# Patient Record
Sex: FEMALE | Race: WHITE | NOT HISPANIC OR LATINO | Employment: PART TIME | URBAN - METROPOLITAN AREA
[De-identification: names, ages, dates, MRNs, and addresses within clinical notes are randomized per-mention and may not be internally consistent; named-entity substitution may affect disease eponyms.]

---

## 2019-03-25 LAB
EXTERNAL ABO GROUPING: (no result)
EXTERNAL ANTIBODY SCREEN: NORMAL
EXTERNAL HEMATOCRIT: 41 %
EXTERNAL HEMOGLOBIN: 14 G/DL
EXTERNAL HEPATITIS B SURFACE ANTIGEN: NEGATIVE
EXTERNAL PLATELET COUNT: 227 K/ΜL
EXTERNAL RH FACTOR: POSITIVE
EXTERNAL SYPHILIS RPR SCREEN: (no result)
RUBV IGG SERPL IA-ACNC: 0.9

## 2019-06-06 ENCOUNTER — TELEPHONE (OUTPATIENT)
Dept: OBSTETRICS AND GYNECOLOGY | Age: 24
End: 2019-06-06

## 2019-06-06 NOTE — TELEPHONE ENCOUNTER
Pt is moving here next week and is needing to get establish with an OB doctor. Pt is 16 weeks. Her last prenatal care visit with them was two weeks ago and pt has one more appt scheduled before she moves for next Friday. No glucose testing done yet or Anatomy U/S. I gave pt our fax # to get her records sent to us.     Ins:Shannon

## 2019-07-11 ENCOUNTER — INITIAL PRENATAL (OUTPATIENT)
Dept: OBSTETRICS AND GYNECOLOGY | Age: 24
End: 2019-07-11

## 2019-07-11 ENCOUNTER — PROCEDURE VISIT (OUTPATIENT)
Dept: OBSTETRICS AND GYNECOLOGY | Age: 24
End: 2019-07-11

## 2019-07-11 VITALS
HEIGHT: 63 IN | DIASTOLIC BLOOD PRESSURE: 58 MMHG | SYSTOLIC BLOOD PRESSURE: 98 MMHG | WEIGHT: 117 LBS | BODY MASS INDEX: 20.73 KG/M2

## 2019-07-11 DIAGNOSIS — Z36.86 ENCOUNTER FOR SCREENING FOR RISK OF PRE-TERM LABOR: ICD-10-CM

## 2019-07-11 DIAGNOSIS — Z34.90 PREGNANCY, UNSPECIFIED GESTATIONAL AGE: Primary | ICD-10-CM

## 2019-07-11 DIAGNOSIS — Z36.89 ENCOUNTER FOR FETAL ANATOMIC SURVEY: Primary | ICD-10-CM

## 2019-07-11 PROBLEM — Z23 NEED FOR MEASLES-MUMPS-RUBELLA (MMR) VACCINE: Status: ACTIVE | Noted: 2019-07-11

## 2019-07-11 LAB — VZV IGG SER QL: NORMAL

## 2019-07-11 PROCEDURE — 76817 TRANSVAGINAL US OBSTETRIC: CPT | Performed by: OBSTETRICS & GYNECOLOGY

## 2019-07-11 PROCEDURE — 76805 OB US >/= 14 WKS SNGL FETUS: CPT | Performed by: OBSTETRICS & GYNECOLOGY

## 2019-07-11 PROCEDURE — 0501F PRENATAL FLOW SHEET: CPT | Performed by: OBSTETRICS & GYNECOLOGY

## 2019-07-11 RX ORDER — LORATADINE 10 MG/1
CAPSULE, LIQUID FILLED ORAL
COMMUNITY
End: 2021-10-15

## 2019-07-11 RX ORDER — CHLORAL HYDRATE 500 MG
CAPSULE ORAL
COMMUNITY
End: 2021-10-15

## 2019-07-11 RX ORDER — LANOLIN ALCOHOL/MO/W.PET/CERES
50 CREAM (GRAM) TOPICAL DAILY
COMMUNITY
End: 2019-09-13

## 2019-07-11 RX ORDER — ASPIRIN 81 MG/1
81 TABLET ORAL DAILY
Qty: 30 TABLET | Refills: 4 | Status: ON HOLD | OUTPATIENT
Start: 2019-07-11 | End: 2019-10-28

## 2019-07-11 NOTE — PROGRESS NOTES
The patient is a 24-year-old  1 para 0 at 21-3/7 weeks who just moved here from [place].  The patient works as a physical therapist and her  is a physician starting his residency and internal medicine and pediatrics.  Patient's pregnancy has gone well.  She has brought records including ultrasound and Pap but her blood work is not included.  She states that pregnancy was dated by ultrasound.  Her menses were about 40 days apart.  Patient declined any genetic testing.  She is taking prenatal vitamins and has no complaints.    Full history was reviewed.  History is remarkable for her mother having preeclampsia.    Limited physical exam is done see physical exam tab  Ultrasound- anatomy ultrasound shows normal anatomy within the limits of ultrasound.  Baby is a girl.  Size equal to dates.  Cervical length is normal at 4.0 cm.  Placenta is anterior with no previa.    Assessment- 21 weeks  Transfer of care due to move for her 's residency.  New OB information and folder were reviewed in detail  Patient is a candidate for low-dose aspirin treatment to prevent preeclampsia due to her nulliparous status and family history of preeclampsia in her mother.  Information was given to the patient and her .  Notes are reviewed.  Ultrasound due date will be used.  Notes state that the patient is rubella nonimmune.  We will request labs from [place]

## 2019-07-26 ENCOUNTER — TELEPHONE (OUTPATIENT)
Dept: OBSTETRICS AND GYNECOLOGY | Age: 24
End: 2019-07-26

## 2019-08-02 ENCOUNTER — ROUTINE PRENATAL (OUTPATIENT)
Dept: OBSTETRICS AND GYNECOLOGY | Age: 24
End: 2019-08-02

## 2019-08-02 VITALS — DIASTOLIC BLOOD PRESSURE: 80 MMHG | SYSTOLIC BLOOD PRESSURE: 124 MMHG | BODY MASS INDEX: 21.43 KG/M2 | WEIGHT: 121 LBS

## 2019-08-02 DIAGNOSIS — Z34.02 ENCOUNTER FOR SUPERVISION OF NORMAL FIRST PREGNANCY IN SECOND TRIMESTER: Primary | ICD-10-CM

## 2019-08-02 PROCEDURE — 0502F SUBSEQUENT PRENATAL CARE: CPT | Performed by: OBSTETRICS & GYNECOLOGY

## 2019-08-02 RX ORDER — RANITIDINE 150 MG/1
150 TABLET ORAL 2 TIMES DAILY
Qty: 60 TABLET | Refills: 5 | Status: SHIPPED | OUTPATIENT
Start: 2019-08-02 | End: 2019-10-11

## 2019-08-02 NOTE — PROGRESS NOTES
Has some reflux.  She is tried taking some over-the-counter medicine but is still having issues.  Patient is considering going without an epidural she may consider nitrous but is still thinking about it.  Patient is supposed to be in a wedding at about 35 weeks.  Patient is feeling positive fetal movement    Fundal height is slightly low but patient is very petite.  She did gain 4 pounds since her last visit and weight gain is normal.  Ultrasound at last visit 3 weeks ago showed normal growth  Blood pressure is normal with no protein    Assessment-24 weeks  Suspect fundal height is low due to patient's small stature-check fetal weight at next visit  We discussed normal labor, epidural and nitrous.  Patient is still considering options.  Start Zantac for reflux  We discussed rubella nonimmune patient will need MMR postdelivery

## 2019-08-30 ENCOUNTER — ROUTINE PRENATAL (OUTPATIENT)
Dept: OBSTETRICS AND GYNECOLOGY | Age: 24
End: 2019-08-30

## 2019-08-30 ENCOUNTER — PROCEDURE VISIT (OUTPATIENT)
Dept: OBSTETRICS AND GYNECOLOGY | Age: 24
End: 2019-08-30

## 2019-08-30 VITALS — WEIGHT: 121 LBS | BODY MASS INDEX: 21.43 KG/M2 | SYSTOLIC BLOOD PRESSURE: 112 MMHG | DIASTOLIC BLOOD PRESSURE: 62 MMHG

## 2019-08-30 DIAGNOSIS — O36.5939 SGA (SMALL FOR GESTATIONAL AGE), FETAL, AFFECTING CARE OF MOTHER, ANTEPARTUM, THIRD TRIMESTER, OTHER FETUS: Primary | ICD-10-CM

## 2019-08-30 DIAGNOSIS — Z23 NEED FOR TDAP VACCINATION: ICD-10-CM

## 2019-08-30 DIAGNOSIS — Z13.1 SCREENING FOR DIABETES MELLITUS: Primary | ICD-10-CM

## 2019-08-30 DIAGNOSIS — Z3A.28 28 WEEKS GESTATION OF PREGNANCY: ICD-10-CM

## 2019-08-30 LAB
BASOPHILS # BLD AUTO: 0.04 10*3/MM3 (ref 0–0.2)
BASOPHILS NFR BLD AUTO: 0.4 % (ref 0–1.5)
EOSINOPHIL # BLD AUTO: 0.06 10*3/MM3 (ref 0–0.4)
EOSINOPHIL NFR BLD AUTO: 0.7 % (ref 0.3–6.2)
ERYTHROCYTE [DISTWIDTH] IN BLOOD BY AUTOMATED COUNT: 12.7 % (ref 12.3–15.4)
GLUCOSE 1H P 50 G GLC PO SERPL-MCNC: 85 MG/DL (ref 65–139)
HCT VFR BLD AUTO: 38.6 % (ref 34–46.6)
HGB BLD-MCNC: 12.9 G/DL (ref 12–15.9)
IMM GRANULOCYTES # BLD AUTO: 0.02 10*3/MM3 (ref 0–0.05)
IMM GRANULOCYTES NFR BLD AUTO: 0.2 % (ref 0–0.5)
LYMPHOCYTES # BLD AUTO: 1.51 10*3/MM3 (ref 0.7–3.1)
LYMPHOCYTES NFR BLD AUTO: 16.4 % (ref 19.6–45.3)
MCH RBC QN AUTO: 31.8 PG (ref 26.6–33)
MCHC RBC AUTO-ENTMCNC: 33.4 G/DL (ref 31.5–35.7)
MCV RBC AUTO: 95.1 FL (ref 79–97)
MONOCYTES # BLD AUTO: 0.43 10*3/MM3 (ref 0.1–0.9)
MONOCYTES NFR BLD AUTO: 4.7 % (ref 5–12)
NEUTROPHILS # BLD AUTO: 7.17 10*3/MM3 (ref 1.7–7)
NEUTROPHILS NFR BLD AUTO: 77.6 % (ref 42.7–76)
NRBC BLD AUTO-RTO: 0 /100 WBC (ref 0–0.2)
PLATELET # BLD AUTO: 219 10*3/MM3 (ref 140–450)
RBC # BLD AUTO: 4.06 10*6/MM3 (ref 3.77–5.28)
WBC # BLD AUTO: 9.23 10*3/MM3 (ref 3.4–10.8)

## 2019-08-30 PROCEDURE — 90715 TDAP VACCINE 7 YRS/> IM: CPT | Performed by: NURSE PRACTITIONER

## 2019-08-30 PROCEDURE — 0502F SUBSEQUENT PRENATAL CARE: CPT | Performed by: NURSE PRACTITIONER

## 2019-08-30 PROCEDURE — 90471 IMMUNIZATION ADMIN: CPT | Performed by: NURSE PRACTITIONER

## 2019-08-30 PROCEDURE — 76816 OB US FOLLOW-UP PER FETUS: CPT | Performed by: NURSE PRACTITIONER

## 2019-08-30 NOTE — PROGRESS NOTES
Here for routine PNV, growth ultrasound and 1 hour glucose test. She is 28w4d.  No problems.  She is taking Zantac BID and reflux symptoms have improved.  She has had one occasion of vomiting in the middle of the night but felt fine immediately after.  She reports good fetal movement and no contractions, bleeding or cramping.    A/P ultrasound done for S<D, EFW is SGA, 2lbs, 7 ozs, 12%.  AC 7.9%. Normal amniotic fluid, DVP 3.1. Discussed with Dr Palomo, will consult with MFM for small for gestational age and follow up here in 2 weeks as scheduled with BPP.

## 2019-09-03 ENCOUNTER — HOSPITAL ENCOUNTER (OUTPATIENT)
Dept: ULTRASOUND IMAGING | Facility: HOSPITAL | Age: 24
End: 2019-09-03

## 2019-09-03 ENCOUNTER — HOSPITAL ENCOUNTER (OUTPATIENT)
Dept: ULTRASOUND IMAGING | Facility: HOSPITAL | Age: 24
Discharge: HOME | End: 2019-09-03
Admitting: OBSTETRICS & GYNECOLOGY

## 2019-09-03 PROCEDURE — 76811 OB US DETAILED SNGL FETUS: CPT

## 2019-09-03 NOTE — CONSULTS
"Ms. Patel is referred by Dr Palomo for MFM consultation on indication of poor fetal growth.  She is unaccompanied during today's exam.  26 G1 at 29 1/7 per AUBRIE 11/18/19    Prenatal course is significant for  Pt's mom had preeclampsia, because of which pt is taking ASA 81mg daily.       PMH    Past Medical History:   Diagnosis Date   • Anxiety    • Broken arm     Right   • Dysmenorrhea        Allergies   Allergen Reactions   • Augmentin [Amoxicillin-Pot Clavulanate] Diarrhea       Meds  PNV  ASA  Zantac  Tums  Promethazine  Claritin    Past Surgical History:   Procedure Laterality Date   • WISDOM TOOTH EXTRACTION             Current Outpatient Medications:   •  aspirin 81 MG EC tablet, Take 1 tablet by mouth Daily., Disp: 30 tablet, Rfl: 4  •  Loratadine (CLARITIN) 10 MG capsule, Take  by mouth., Disp: , Rfl:   •  Omega-3 Fatty Acids (FISH OIL) 1000 MG capsule capsule, Take  by mouth Daily With Breakfast., Disp: , Rfl:   •  Prenatal Vit w/Ru-Uzciqtkwn-VH (PNV PO), Take  by mouth., Disp: , Rfl:   •  raNITIdine (ZANTAC) 150 MG tablet, Take 1 tablet by mouth 2 (Two) Times a Day., Disp: 60 tablet, Rfl: 5  •  vitamin B-6 (PYRIDOXINE) 50 MG tablet, Take 50 mg by mouth Daily., Disp: , Rfl:       Family history is negative for mental retardation, trisomy 21, congenital heart disease, spina bifida, cystic fibrosis, muscular dystrophy, other birth defects, Hinduism ancestry.   The patient has a family history of: none of the above.        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       Impression:  29 1/7 per AUBRIE 11/18/19  Normal fetal growth  BMI 21  Rubella nonimmune.         Recommendations:  Evaluate interval growth at [facility] in 3-4 weeks.   Continue ASA 81 mg daily.     TDaP vaccine at next prenatal appointment.     For billing purposes, " duration of face to face consultation was approximately 15 minutes of which > 50% was devoted to patient counseling and coordination of care, exclusive of US exam.

## 2019-09-03 NOTE — CONSULTS
"Ms. Patel is referred by Dr Palomo for MFM consultation on indication of poor fetal growth.  She is unaccompanied during today's exam.  26 G1 at 29 1/7 per AUBRIE 11/18/19     Prenatal course is significant for  Pt's mom had preeclampsia, because of which pt is taking ASA 81mg daily.   Pt states that she weighed 5#6oz when she was born at full term. Her spouse is also of modest stature.     PMH     Medical History        Past Medical History:   Diagnosis Date   • Anxiety     • Broken arm       Right   • Dysmenorrhea                   Allergies   Allergen Reactions   • Augmentin [Amoxicillin-Pot Clavulanate] Diarrhea         Meds  PNV  ASA  Zantac  Tums  Promethazine  Claritin     Surgical History         Past Surgical History:   Procedure Laterality Date   • WISDOM TOOTH EXTRACTION                      Current Outpatient Medications:   •  aspirin 81 MG EC tablet, Take 1 tablet by mouth Daily., Disp: 30 tablet, Rfl: 4  •  Loratadine (CLARITIN) 10 MG capsule, Take  by mouth., Disp: , Rfl:   •  Omega-3 Fatty Acids (FISH OIL) 1000 MG capsule capsule, Take  by mouth Daily With Breakfast., Disp: , Rfl:   •  Prenatal Vit w/Kx-Qltzcwbvk-BG (PNV PO), Take  by mouth., Disp: , Rfl:   •  raNITIdine (ZANTAC) 150 MG tablet, Take 1 tablet by mouth 2 (Two) Times a Day., Disp: 60 tablet, Rfl: 5  •  vitamin B-6 (PYRIDOXINE) 50 MG tablet, Take 50 mg by mouth Daily., Disp: , Rfl:         Family history is negative for mental retardation, trisomy 21, congenital heart disease, spina bifida, cystic fibrosis, muscular dystrophy, other birth defects, Islam ancestry.   The patient has a family history of: none of the above.        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         Impression:  29 1/7 per AUBRIE 11/18/19  Normal fetal growth  BMI 21  Rubella " nonimmune.            Recommendations:  Evaluate interval growth at [facility] in 3-4 weeks.   Continue ASA 81 mg daily.      TDaP vaccine at next prenatal appointment.      For billing purposes, duration of face to face consultation was approximately 15 minutes of which > 50% was devoted to patient counseling and coordination of care, exclusive of US exam.

## 2019-09-12 ENCOUNTER — TRANSCRIBE ORDERS (OUTPATIENT)
Dept: OBSTETRICS AND GYNECOLOGY | Facility: CLINIC | Age: 24
End: 2019-09-12

## 2019-09-13 ENCOUNTER — PROCEDURE VISIT (OUTPATIENT)
Dept: OBSTETRICS AND GYNECOLOGY | Age: 24
End: 2019-09-13

## 2019-09-13 ENCOUNTER — ROUTINE PRENATAL (OUTPATIENT)
Dept: OBSTETRICS AND GYNECOLOGY | Age: 24
End: 2019-09-13

## 2019-09-13 ENCOUNTER — TELEPHONE (OUTPATIENT)
Dept: OBSTETRICS AND GYNECOLOGY | Age: 24
End: 2019-09-13

## 2019-09-13 ENCOUNTER — HOSPITAL ENCOUNTER (OUTPATIENT)
Facility: HOSPITAL | Age: 24
Setting detail: OBSERVATION
Discharge: HOME | End: 2019-09-13
Attending: OBSTETRICS & GYNECOLOGY | Admitting: OBSTETRICS & GYNECOLOGY

## 2019-09-13 VITALS
HEART RATE: 63 BPM | BODY MASS INDEX: 21.79 KG/M2 | WEIGHT: 123 LBS | HEIGHT: 63 IN | SYSTOLIC BLOOD PRESSURE: 128 MMHG | TEMPERATURE: 98 F | DIASTOLIC BLOOD PRESSURE: 72 MMHG | RESPIRATION RATE: 18 BRPM

## 2019-09-13 VITALS — BODY MASS INDEX: 21.79 KG/M2 | DIASTOLIC BLOOD PRESSURE: 78 MMHG | WEIGHT: 123 LBS | SYSTOLIC BLOOD PRESSURE: 124 MMHG

## 2019-09-13 DIAGNOSIS — O36.5939 SGA (SMALL FOR GESTATIONAL AGE), FETAL, AFFECTING CARE OF MOTHER, ANTEPARTUM, THIRD TRIMESTER, OTHER FETUS: Primary | ICD-10-CM

## 2019-09-13 LAB
ABO GROUP BLD: NORMAL
FETAL RBC/RBC BLD FC-RTO: 0 %
HGB F BLD QL KLEIH BETKE: NORMAL
RH BLD: POSITIVE

## 2019-09-13 PROCEDURE — 0502F SUBSEQUENT PRENATAL CARE: CPT | Performed by: OBSTETRICS & GYNECOLOGY

## 2019-09-13 PROCEDURE — 59025 FETAL NON-STRESS TEST: CPT

## 2019-09-13 PROCEDURE — 76816 OB US FOLLOW-UP PER FETUS: CPT | Performed by: OBSTETRICS & GYNECOLOGY

## 2019-09-13 PROCEDURE — 59025 FETAL NON-STRESS TEST: CPT | Performed by: OBSTETRICS & GYNECOLOGY

## 2019-09-13 PROCEDURE — 86900 BLOOD TYPING SEROLOGIC ABO: CPT | Performed by: OBSTETRICS & GYNECOLOGY

## 2019-09-13 PROCEDURE — 86901 BLOOD TYPING SEROLOGIC RH(D): CPT | Performed by: OBSTETRICS & GYNECOLOGY

## 2019-09-13 PROCEDURE — 76819 FETAL BIOPHYS PROFIL W/O NST: CPT | Performed by: OBSTETRICS & GYNECOLOGY

## 2019-09-13 PROCEDURE — 85460 HEMOGLOBIN FETAL: CPT | Performed by: OBSTETRICS & GYNECOLOGY

## 2019-09-13 PROCEDURE — G0378 HOSPITAL OBSERVATION PER HR: HCPCS

## 2019-09-13 NOTE — TELEPHONE ENCOUNTER
----- Message from Phillip Palomo MD sent at 9/13/2019 12:50 PM EDT -----  Please notify KB stain is negative/normal.

## 2019-09-13 NOTE — NON STRESS TEST
Anne Marie Patel, a  at 30w4d with an AUBRIE of 2019, by Other Basis, was seen at [facility] for a nonstress test.    Chief Complaint   Patient presents with   • Fall     patient states she was running and fell on side. Pt states +FM, denies LOF or VB. No other complaints this morning. Was sent from office for KB stain       Patient Active Problem List   Diagnosis   • Pregnancy   • Need for measles-mumps-rubella (MMR) vaccine   • Small for gestational age (SGA)       Start Time: 1012  Stop Time: 1037    Interpretation A  Nonstress Test Interpretation A: Reactive (19 1037 : Torrie Alan RN)

## 2019-09-13 NOTE — PROGRESS NOTES
Patient reports she had a bad fall with running this morning.  She tripped on something on the pavement and had a fall onto hard pavement.  She hit her abdomen and right side.  She has a bandage on her knee.  She is feeling positive fetal movement with no vaginal bleeding.  She states she feels very [place] and is worried.  She was here today for follow-up.    Patient's ultrasound for growth here showed baby at the 12 percentile and abdominal circumference at the 7th percentile.  She was sent for maternal-fetal medicine consultation.  Ultrasound there on 9 3 showed weight at the 37th percentile and abdominal circumference at the 20th percentile.  She does have follow-up scheduled with a year on the 24th for repeat weight.    Fundal height is low at 28 cm.  Weight gain for pregnancy is low at 15 pounds  Blood pressure 124/78 with no protein  Patient has a bandage on her right knee.  Physical profile is 8 out of 8.  Weight arm ultrasound is 2 pounds 14 ounces at the 4th percentile.  Abdominal circumference is at the 1st percentile    Assessment-30 weeks  Fall-she is blood type is a positive.  I will send patient over to labor and delivery for an NST.  I doubt abruption but we can check a KB stain just to be sure.  She did hit the pavement rather hard.  SGA- weight at maternal-fetal medicine is reassuring.  Weight was inadvertently done by the tech here but is low.  She will follow-up with maternal-fetal medicine on the 24th but I recommend weekly BPP's and kick counts.  Very likely weight is due to patient's small stature.  I did recommend increase calories and increased weight gain.  I recommend patient decrease her running.

## 2019-09-13 NOTE — NURSING NOTE
Dr. Yang aware patient is here. R tracing WNL. Reactive NST. Pt had one ctx, was unaware of it. MD states patient can go home now. Will give patient call for KB stain.

## 2019-09-20 ENCOUNTER — ROUTINE PRENATAL (OUTPATIENT)
Dept: OBSTETRICS AND GYNECOLOGY | Age: 24
End: 2019-09-20

## 2019-09-20 ENCOUNTER — PROCEDURE VISIT (OUTPATIENT)
Dept: OBSTETRICS AND GYNECOLOGY | Age: 24
End: 2019-09-20

## 2019-09-20 VITALS — SYSTOLIC BLOOD PRESSURE: 120 MMHG | BODY MASS INDEX: 22.14 KG/M2 | DIASTOLIC BLOOD PRESSURE: 74 MMHG | WEIGHT: 125 LBS

## 2019-09-20 DIAGNOSIS — Z34.90 PREGNANCY, UNSPECIFIED GESTATIONAL AGE: ICD-10-CM

## 2019-09-20 DIAGNOSIS — O36.5939 SGA (SMALL FOR GESTATIONAL AGE), FETAL, AFFECTING CARE OF MOTHER, ANTEPARTUM, THIRD TRIMESTER, OTHER FETUS: Primary | ICD-10-CM

## 2019-09-20 PROCEDURE — 76819 FETAL BIOPHYS PROFIL W/O NST: CPT | Performed by: OBSTETRICS & GYNECOLOGY

## 2019-09-20 PROCEDURE — 0502F SUBSEQUENT PRENATAL CARE: CPT | Performed by: OBSTETRICS & GYNECOLOGY

## 2019-09-20 NOTE — PROGRESS NOTES
Patient is feeling better since her fall.  Her KB was negative.  She is still running some walking more.  She is tried to increase her calories.  Good fetal movement and no contractions.    2 pound weight gain since last visit.  Weight gain for pregnancy is still low.  Fundal height is still low at 29 cm.  Blood pressure is normal at 120/74 with no protein.  No lower extremity edema.    Biophysical profile is 8 out of 8 with WENDY 14.  Baby is vertex.    Assessment-31 weeks SGA  Physical profile and fetal movements are reassuring.  Continue weekly BPP's.  Patient has a follow-up ultrasound with maternal-fetal medicine on Tuesday, September 24.  She will follow-up here on Friday.  I encouraged kick counts.  Family history of preeclampsia in her mother-continue low-dose aspirin

## 2019-09-24 ENCOUNTER — HOSPITAL ENCOUNTER (OUTPATIENT)
Dept: ULTRASOUND IMAGING | Facility: HOSPITAL | Age: 24
Discharge: HOME | End: 2019-09-24
Admitting: OBSTETRICS & GYNECOLOGY

## 2019-09-24 ENCOUNTER — OFFICE VISIT (OUTPATIENT)
Dept: OBSTETRICS AND GYNECOLOGY | Facility: CLINIC | Age: 24
End: 2019-09-24

## 2019-09-24 VITALS
HEIGHT: 63 IN | DIASTOLIC BLOOD PRESSURE: 60 MMHG | WEIGHT: 124 LBS | HEART RATE: 64 BPM | BODY MASS INDEX: 21.97 KG/M2 | SYSTOLIC BLOOD PRESSURE: 99 MMHG

## 2019-09-24 PROCEDURE — 76818 FETAL BIOPHYS PROFILE W/NST: CPT | Performed by: OBSTETRICS & GYNECOLOGY

## 2019-09-24 PROCEDURE — 76816 OB US FOLLOW-UP PER FETUS: CPT | Performed by: OBSTETRICS & GYNECOLOGY

## 2019-09-24 PROCEDURE — 99213 OFFICE O/P EST LOW 20 MIN: CPT | Performed by: OBSTETRICS & GYNECOLOGY

## 2019-09-24 PROCEDURE — 76818 FETAL BIOPHYS PROFILE W/NST: CPT

## 2019-09-24 PROCEDURE — 76816 OB US FOLLOW-UP PER FETUS: CPT

## 2019-09-24 NOTE — PROGRESS NOTES
Patient seen in Maternal Fetal Medicine clinic today. Please see full note in [employer].   Lola Jiang MD

## 2019-09-27 ENCOUNTER — ROUTINE PRENATAL (OUTPATIENT)
Dept: OBSTETRICS AND GYNECOLOGY | Age: 24
End: 2019-09-27

## 2019-09-27 ENCOUNTER — PROCEDURE VISIT (OUTPATIENT)
Dept: OBSTETRICS AND GYNECOLOGY | Age: 24
End: 2019-09-27

## 2019-09-27 VITALS — SYSTOLIC BLOOD PRESSURE: 108 MMHG | BODY MASS INDEX: 22.14 KG/M2 | DIASTOLIC BLOOD PRESSURE: 74 MMHG | WEIGHT: 125 LBS

## 2019-09-27 DIAGNOSIS — O36.5939 SGA (SMALL FOR GESTATIONAL AGE), FETAL, AFFECTING CARE OF MOTHER, ANTEPARTUM, THIRD TRIMESTER, OTHER FETUS: Primary | ICD-10-CM

## 2019-09-27 PROCEDURE — 76819 FETAL BIOPHYS PROFIL W/O NST: CPT | Performed by: OBSTETRICS & GYNECOLOGY

## 2019-09-27 PROCEDURE — 0502F SUBSEQUENT PRENATAL CARE: CPT | Performed by: OBSTETRICS & GYNECOLOGY

## 2019-09-27 NOTE — PROGRESS NOTES
Patient had consultation with maternal-fetal medicine.  Estimated fetal weight by ultrasound was 3 pounds 13 ounces at the 32nd percentile.  Recommendation for repeat weight at 36 weeks.  Patient is feeling good fetal movement.  She is tried to increase her calories.  Her weight did go up by 1 pound.  She discussed with M her running.  They recommended that she could continue to run but to try to not make it too strenuous.  No headaches or blurry vision.    Biophysical profile is 8 out of 8 with WENDY of 9.2.  Baby is now vertex.  Total weight gain for pregnancy is 17 pounds  Blood pressure 108/74 with no protein  Fundal height is low but improved from last visit at 30 cm.    Assessment-32 weeks  Low fundal height but normal weight by ultrasound.  Recheck at 36 weeks.  Encourage weight gain.  Family history of preeclampsia in her mother-continue low-dose aspirin and call with signs or symptoms of preeclampsia.  Encourage kick counts.

## 2019-10-02 ENCOUNTER — PATIENT MESSAGE (OUTPATIENT)
Dept: OBSTETRICS AND GYNECOLOGY | Age: 24
End: 2019-10-02

## 2019-10-03 RX ORDER — ESOMEPRAZOLE MAGNESIUM 40 MG/1
40 CAPSULE, DELAYED RELEASE ORAL
Qty: 30 CAPSULE | Refills: 3 | Status: SHIPPED | OUTPATIENT
Start: 2019-10-03 | End: 2019-11-01 | Stop reason: HOSPADM

## 2019-10-03 NOTE — TELEPHONE ENCOUNTER
Regarding: Prescription Question  Contact: [phone]  ----- Message from Mychart, Generic sent at 10/2/2019  7:35 PM EDT -----    Dr. Palomo,   I have been taking Ranitidine for heartburn since you prescribed it a couple months ago. I saw today that many versions of it have been recalled for NMDA. Would you recommend that I stop taking it? I would be okay with that, and honestly I am not sure how much it is helping - I still have pretty bad heartburn most days, so I had been meaning to ask you if there were other options anyway.   Thanks in advance!   Nicole

## 2019-10-03 NOTE — TELEPHONE ENCOUNTER
If it is not working I would recommend that we change to Nexium 40 mg 1 p.o. daily #30 with 3 refills.  Okay to send to the pharmacy.

## 2019-10-11 ENCOUNTER — ROUTINE PRENATAL (OUTPATIENT)
Dept: OBSTETRICS AND GYNECOLOGY | Age: 24
End: 2019-10-11

## 2019-10-11 VITALS — WEIGHT: 126 LBS | BODY MASS INDEX: 22.32 KG/M2 | SYSTOLIC BLOOD PRESSURE: 112 MMHG | DIASTOLIC BLOOD PRESSURE: 72 MMHG

## 2019-10-11 DIAGNOSIS — Z3A.33 33 WEEKS GESTATION OF PREGNANCY: ICD-10-CM

## 2019-10-11 DIAGNOSIS — Z34.03 ENCOUNTER FOR SUPERVISION OF NORMAL FIRST PREGNANCY IN THIRD TRIMESTER: Primary | ICD-10-CM

## 2019-10-11 PROCEDURE — 0502F SUBSEQUENT PRENATAL CARE: CPT | Performed by: OBSTETRICS & GYNECOLOGY

## 2019-10-11 RX ORDER — INFLUENZA A VIRUS A/MICHIGAN/45/2015 X-275 (H1N1) ANTIGEN (FORMALDEHYDE INACTIVATED), INFLUENZA A VIRUS A/SINGAPORE/INFIMH-16-0019/2016 IVR-186 (H3N2) ANTIGEN (FORMALDEHYDE INACTIVATED), INFLUENZA B VIRUS B/PHUKET/3073/2013 ANTIGEN (FORMALDEHYDE INACTIVATED), AND INFLUENZA B VIRUS B/MARYLAND/15/2016 BX-69A ANTIGEN (FORMALDEHYDE INACTIVATED) 15; 15; 15; 15 UG/.5ML; UG/.5ML; UG/.5ML; UG/.5ML
INJECTION, SUSPENSION INTRAMUSCULAR
Refills: 0 | Status: ON HOLD | COMMUNITY
Start: 2019-10-01 | End: 2019-10-28

## 2019-10-11 NOTE — PROGRESS NOTES
Patient notes the Nexium is working well.  She is able to eat more.  Baby is active.  She is here today with her .  No bleeding or contractions.  She has taken some classes at the hospital.  She does plan to travel this weekend.    Blood pressure is normal at 112/72 with no protein 1 pound weight gain since last visit for total weight gain of 18 pounds  Fundal height is low at 31 cm but improved from last visit.  No lower extremity edema.    Assessment-34 weeks 4 days  Low fundal height but normal weight by ultrasound.  Plan to recheck fetal weight at 36 weeks.  Encourage weight gain and kick counts.  We discussed likely constitutionally smaller baby due to the patient being so petite but we will need ultrasound to rule out IUGR.  Family history of preeclampsia in her mother-continue low-dose aspirin  Flu vaccine has been done.

## 2019-10-25 ENCOUNTER — CLINICAL SUPPORT (OUTPATIENT)
Dept: OBSTETRICS AND GYNECOLOGY | Facility: CLINIC | Age: 24
End: 2019-10-25

## 2019-10-25 ENCOUNTER — PROCEDURE VISIT (OUTPATIENT)
Dept: OBSTETRICS AND GYNECOLOGY | Age: 24
End: 2019-10-25

## 2019-10-25 ENCOUNTER — ROUTINE PRENATAL (OUTPATIENT)
Dept: OBSTETRICS AND GYNECOLOGY | Age: 24
End: 2019-10-25

## 2019-10-25 ENCOUNTER — HOSPITAL ENCOUNTER (OUTPATIENT)
Dept: ULTRASOUND IMAGING | Facility: HOSPITAL | Age: 24
Discharge: HOME | End: 2019-10-25
Admitting: OBSTETRICS & GYNECOLOGY

## 2019-10-25 ENCOUNTER — HOSPITAL ENCOUNTER (INPATIENT)
Facility: HOSPITAL | Age: 24
LOS: 7 days | Discharge: HOME | End: 2019-11-01
Attending: OBSTETRICS & GYNECOLOGY | Admitting: OBSTETRICS & GYNECOLOGY

## 2019-10-25 VITALS — SYSTOLIC BLOOD PRESSURE: 116 MMHG | WEIGHT: 130 LBS | BODY MASS INDEX: 23.03 KG/M2 | DIASTOLIC BLOOD PRESSURE: 72 MMHG

## 2019-10-25 VITALS
WEIGHT: 130.4 LBS | HEART RATE: 80 BPM | SYSTOLIC BLOOD PRESSURE: 104 MMHG | BODY MASS INDEX: 22.26 KG/M2 | DIASTOLIC BLOOD PRESSURE: 60 MMHG | HEIGHT: 64 IN

## 2019-10-25 DIAGNOSIS — O36.5939 SGA (SMALL FOR GESTATIONAL AGE), FETAL, AFFECTING CARE OF MOTHER, ANTEPARTUM, THIRD TRIMESTER, OTHER FETUS: Primary | ICD-10-CM

## 2019-10-25 DIAGNOSIS — O36.5930 IUGR (INTRAUTERINE GROWTH RETARDATION) AFFECTING MOTHER, THIRD TRIMESTER, NOT APPLICABLE OR UNSPECIFIED FETUS: ICD-10-CM

## 2019-10-25 DIAGNOSIS — Z36.85 ANTENATAL SCREENING FOR STREPTOCOCCUS B: Primary | ICD-10-CM

## 2019-10-25 PROCEDURE — 99221 1ST HOSP IP/OBS SF/LOW 40: CPT | Performed by: OBSTETRICS & GYNECOLOGY

## 2019-10-25 PROCEDURE — 59025 FETAL NON-STRESS TEST: CPT | Performed by: OBSTETRICS & GYNECOLOGY

## 2019-10-25 PROCEDURE — 76820 UMBILICAL ARTERY ECHO: CPT

## 2019-10-25 PROCEDURE — 76816 OB US FOLLOW-UP PER FETUS: CPT

## 2019-10-25 PROCEDURE — 76816 OB US FOLLOW-UP PER FETUS: CPT | Performed by: OBSTETRICS & GYNECOLOGY

## 2019-10-25 PROCEDURE — 0502F SUBSEQUENT PRENATAL CARE: CPT | Performed by: OBSTETRICS & GYNECOLOGY

## 2019-10-25 PROCEDURE — 59025 FETAL NON-STRESS TEST: CPT

## 2019-10-25 PROCEDURE — 25010000002 BETAMETHASONE ACET & SOD PHOS PER 4 MG: Performed by: OBSTETRICS & GYNECOLOGY

## 2019-10-25 PROCEDURE — S0260 H&P FOR SURGERY: HCPCS | Performed by: OBSTETRICS & GYNECOLOGY

## 2019-10-25 RX ORDER — BETAMETHASONE SODIUM PHOSPHATE AND BETAMETHASONE ACETATE 3; 3 MG/ML; MG/ML
12 INJECTION, SUSPENSION INTRA-ARTICULAR; INTRALESIONAL; INTRAMUSCULAR; SOFT TISSUE EVERY 12 HOURS
Status: COMPLETED | OUTPATIENT
Start: 2019-10-25 | End: 2019-10-26

## 2019-10-25 RX ORDER — ONDANSETRON 4 MG/1
4 TABLET, FILM COATED ORAL EVERY 6 HOURS PRN
Status: DISCONTINUED | OUTPATIENT
Start: 2019-10-25 | End: 2019-10-25

## 2019-10-25 RX ORDER — ACETAMINOPHEN 325 MG/1
650 TABLET ORAL EVERY 4 HOURS PRN
Status: DISCONTINUED | OUTPATIENT
Start: 2019-10-25 | End: 2019-10-25

## 2019-10-25 RX ORDER — SODIUM CHLORIDE 0.9 % (FLUSH) 0.9 %
10 SYRINGE (ML) INJECTION AS NEEDED
Status: DISCONTINUED | OUTPATIENT
Start: 2019-10-25 | End: 2019-10-25

## 2019-10-25 RX ORDER — FAMOTIDINE 10 MG/ML
20 INJECTION, SOLUTION INTRAVENOUS 2 TIMES DAILY PRN
Status: DISCONTINUED | OUTPATIENT
Start: 2019-10-25 | End: 2019-10-25

## 2019-10-25 RX ORDER — FAMOTIDINE 20 MG/1
20 TABLET, FILM COATED ORAL 2 TIMES DAILY PRN
Status: DISCONTINUED | OUTPATIENT
Start: 2019-10-25 | End: 2019-10-25

## 2019-10-25 RX ORDER — SODIUM CHLORIDE, SODIUM LACTATE, POTASSIUM CHLORIDE, CALCIUM CHLORIDE 600; 310; 30; 20 MG/100ML; MG/100ML; MG/100ML; MG/100ML
125 INJECTION, SOLUTION INTRAVENOUS CONTINUOUS
Status: DISCONTINUED | OUTPATIENT
Start: 2019-10-25 | End: 2019-10-25

## 2019-10-25 RX ORDER — LIDOCAINE HYDROCHLORIDE 10 MG/ML
5 INJECTION, SOLUTION EPIDURAL; INFILTRATION; INTRACAUDAL; PERINEURAL AS NEEDED
Status: DISCONTINUED | OUTPATIENT
Start: 2019-10-25 | End: 2019-10-25

## 2019-10-25 RX ORDER — ONDANSETRON 2 MG/ML
4 INJECTION INTRAMUSCULAR; INTRAVENOUS EVERY 6 HOURS PRN
Status: DISCONTINUED | OUTPATIENT
Start: 2019-10-25 | End: 2019-10-25

## 2019-10-25 RX ORDER — TERBUTALINE SULFATE 1 MG/ML
0.25 INJECTION, SOLUTION SUBCUTANEOUS AS NEEDED
Status: DISCONTINUED | OUTPATIENT
Start: 2019-10-25 | End: 2019-10-25

## 2019-10-25 RX ORDER — SODIUM CHLORIDE 0.9 % (FLUSH) 0.9 %
3 SYRINGE (ML) INJECTION EVERY 12 HOURS SCHEDULED
Status: DISCONTINUED | OUTPATIENT
Start: 2019-10-25 | End: 2019-10-25

## 2019-10-25 RX ADMIN — BETAMETHASONE SODIUM PHOSPHATE AND BETAMETHASONE ACETATE 12 MG: 3; 3 INJECTION, SUSPENSION INTRA-ARTICULAR; INTRALESIONAL; INTRAMUSCULAR at 20:27

## 2019-10-25 NOTE — PROGRESS NOTES
Patient is feeling very active fetal movement.  No contractions or vaginal bleeding.  She does feel some pelvic pressure.  She is still running.  She notes that she was about a 5-1/2 pound baby at 41 weeks.    Ultrasound shows IUGR with fetal weight of 4 pounds 14 ounces at the 5th percentile.  AB circumflex is less than the first.  Symmetrical growth restriction is noted.  WENDY is 10.  Biophysical profile is 8 out of 8.  Fundal height is low at 33 cm but improved by 2 cm from last visit.  Blood pressure 116/72 with no protein  Group B strep swab is collected.  Cervix is closed thick and firm    Assessment-36 weeks 4 days  IUGR by ultrasound we discussed potential for placental insufficiency versus constitutional SGA.  Patient is of low body weight and family history of small babies.  Patient is on low-dose aspirin.  I will send the patient over today to maternal-fetal medicine for Dopplers and consultation.  Follow-up here twice weekly.  Scheduled for BPP here on Tuesday  Family history of preeclampsia in her mother-normal blood pressure today

## 2019-10-26 LAB
ABO GROUP BLD: NORMAL
BLD GP AB SCN SERPL QL: NEGATIVE
DEPRECATED RDW RBC AUTO: 38.7 FL (ref 37–54)
ERYTHROCYTE [DISTWIDTH] IN BLOOD BY AUTOMATED COUNT: 11.7 % (ref 12.3–15.4)
HCT VFR BLD AUTO: 41 % (ref 34–46.6)
HGB BLD-MCNC: 14.2 G/DL (ref 12–15.9)
MCH RBC QN AUTO: 31.6 PG (ref 26.6–33)
MCHC RBC AUTO-ENTMCNC: 34.6 G/DL (ref 31.5–35.7)
MCV RBC AUTO: 91.1 FL (ref 79–97)
PLATELET # BLD AUTO: 230 10*3/MM3 (ref 140–450)
PMV BLD AUTO: 10.6 FL (ref 6–12)
RBC # BLD AUTO: 4.5 10*6/MM3 (ref 3.77–5.28)
RH BLD: POSITIVE
T&S EXPIRATION DATE: NORMAL
WBC NRBC COR # BLD: 8.54 10*3/MM3 (ref 3.4–10.8)

## 2019-10-26 PROCEDURE — 86900 BLOOD TYPING SEROLOGIC ABO: CPT | Performed by: OBSTETRICS & GYNECOLOGY

## 2019-10-26 PROCEDURE — 59025 FETAL NON-STRESS TEST: CPT | Performed by: OBSTETRICS & GYNECOLOGY

## 2019-10-26 PROCEDURE — 59025 FETAL NON-STRESS TEST: CPT

## 2019-10-26 PROCEDURE — 25010000002 BETAMETHASONE ACET & SOD PHOS PER 4 MG: Performed by: OBSTETRICS & GYNECOLOGY

## 2019-10-26 PROCEDURE — 86901 BLOOD TYPING SEROLOGIC RH(D): CPT | Performed by: OBSTETRICS & GYNECOLOGY

## 2019-10-26 PROCEDURE — 86850 RBC ANTIBODY SCREEN: CPT | Performed by: OBSTETRICS & GYNECOLOGY

## 2019-10-26 PROCEDURE — 85027 COMPLETE CBC AUTOMATED: CPT | Performed by: OBSTETRICS & GYNECOLOGY

## 2019-10-26 PROCEDURE — 99232 SBSQ HOSP IP/OBS MODERATE 35: CPT | Performed by: OBSTETRICS & GYNECOLOGY

## 2019-10-26 RX ORDER — ZOLPIDEM TARTRATE 5 MG/1
5 TABLET ORAL NIGHTLY PRN
Status: DISCONTINUED | OUTPATIENT
Start: 2019-10-26 | End: 2019-10-28

## 2019-10-26 RX ADMIN — ZOLPIDEM TARTRATE 5 MG: 5 TABLET ORAL at 21:53

## 2019-10-26 RX ADMIN — BETAMETHASONE SODIUM PHOSPHATE AND BETAMETHASONE ACETATE 12 MG: 3; 3 INJECTION, SUSPENSION INTRA-ARTICULAR; INTRALESIONAL; INTRAMUSCULAR at 08:28

## 2019-10-27 ENCOUNTER — ANESTHESIA (OUTPATIENT)
Dept: LABOR AND DELIVERY | Facility: HOSPITAL | Age: 24
End: 2019-10-27

## 2019-10-27 ENCOUNTER — ANESTHESIA EVENT (OUTPATIENT)
Dept: LABOR AND DELIVERY | Facility: HOSPITAL | Age: 24
End: 2019-10-27

## 2019-10-27 LAB
ABO GROUP BLD: NORMAL
BLD GP AB SCN SERPL QL: NEGATIVE
DEPRECATED RDW RBC AUTO: 43.3 FL (ref 37–54)
ERYTHROCYTE [DISTWIDTH] IN BLOOD BY AUTOMATED COUNT: 12.2 % (ref 12.3–15.4)
GP B STREP DNA SPEC QL NAA+PROBE: NEGATIVE
HCT VFR BLD AUTO: 37.5 % (ref 34–46.6)
HGB BLD-MCNC: 12.8 G/DL (ref 12–15.9)
MCH RBC QN AUTO: 32.3 PG (ref 26.6–33)
MCHC RBC AUTO-ENTMCNC: 34.1 G/DL (ref 31.5–35.7)
MCV RBC AUTO: 94.7 FL (ref 79–97)
PLATELET # BLD AUTO: 205 10*3/MM3 (ref 140–450)
PMV BLD AUTO: 10.7 FL (ref 6–12)
RBC # BLD AUTO: 3.96 10*6/MM3 (ref 3.77–5.28)
RH BLD: POSITIVE
T&S EXPIRATION DATE: NORMAL
WBC NRBC COR # BLD: 12.58 10*3/MM3 (ref 3.4–10.8)

## 2019-10-27 PROCEDURE — 59025 FETAL NON-STRESS TEST: CPT | Performed by: OBSTETRICS & GYNECOLOGY

## 2019-10-27 PROCEDURE — 59025 FETAL NON-STRESS TEST: CPT

## 2019-10-27 PROCEDURE — 85027 COMPLETE CBC AUTOMATED: CPT | Performed by: OBSTETRICS & GYNECOLOGY

## 2019-10-27 PROCEDURE — 86850 RBC ANTIBODY SCREEN: CPT | Performed by: OBSTETRICS & GYNECOLOGY

## 2019-10-27 PROCEDURE — 99232 SBSQ HOSP IP/OBS MODERATE 35: CPT | Performed by: OBSTETRICS & GYNECOLOGY

## 2019-10-27 PROCEDURE — 3E0P7VZ INTRODUCTION OF HORMONE INTO FEMALE REPRODUCTIVE, VIA NATURAL OR ARTIFICIAL OPENING: ICD-10-PCS | Performed by: OBSTETRICS & GYNECOLOGY

## 2019-10-27 PROCEDURE — 86900 BLOOD TYPING SEROLOGIC ABO: CPT | Performed by: OBSTETRICS & GYNECOLOGY

## 2019-10-27 PROCEDURE — 86901 BLOOD TYPING SEROLOGIC RH(D): CPT | Performed by: OBSTETRICS & GYNECOLOGY

## 2019-10-27 RX ORDER — EPHEDRINE SULFATE 50 MG/ML
5 INJECTION, SOLUTION INTRAVENOUS AS NEEDED
Status: DISCONTINUED | OUTPATIENT
Start: 2019-10-27 | End: 2019-10-28 | Stop reason: HOSPADM

## 2019-10-27 RX ORDER — SODIUM CHLORIDE, SODIUM LACTATE, POTASSIUM CHLORIDE, CALCIUM CHLORIDE 600; 310; 30; 20 MG/100ML; MG/100ML; MG/100ML; MG/100ML
125 INJECTION, SOLUTION INTRAVENOUS CONTINUOUS
Status: DISCONTINUED | OUTPATIENT
Start: 2019-10-27 | End: 2019-10-28

## 2019-10-27 RX ORDER — FAMOTIDINE 10 MG/ML
20 INJECTION, SOLUTION INTRAVENOUS ONCE AS NEEDED
Status: COMPLETED | OUTPATIENT
Start: 2019-10-27 | End: 2019-10-28

## 2019-10-27 RX ORDER — DIPHENHYDRAMINE HYDROCHLORIDE 50 MG/ML
12.5 INJECTION INTRAMUSCULAR; INTRAVENOUS EVERY 8 HOURS PRN
Status: DISCONTINUED | OUTPATIENT
Start: 2019-10-27 | End: 2019-10-28 | Stop reason: HOSPADM

## 2019-10-27 RX ORDER — ONDANSETRON 2 MG/ML
4 INJECTION INTRAMUSCULAR; INTRAVENOUS ONCE AS NEEDED
Status: DISCONTINUED | OUTPATIENT
Start: 2019-10-27 | End: 2019-10-28 | Stop reason: HOSPADM

## 2019-10-27 RX ADMIN — ZOLPIDEM TARTRATE 5 MG: 5 TABLET ORAL at 21:17

## 2019-10-27 RX ADMIN — SODIUM CHLORIDE, POTASSIUM CHLORIDE, SODIUM LACTATE AND CALCIUM CHLORIDE 999 ML/HR: 600; 310; 30; 20 INJECTION, SOLUTION INTRAVENOUS at 18:40

## 2019-10-27 RX ADMIN — DINOPROSTONE 10 MG: 10 INSERT VAGINAL at 18:58

## 2019-10-27 RX ADMIN — SODIUM CHLORIDE, POTASSIUM CHLORIDE, SODIUM LACTATE AND CALCIUM CHLORIDE 125 ML/HR: 600; 310; 30; 20 INJECTION, SOLUTION INTRAVENOUS at 23:09

## 2019-10-27 NOTE — ANESTHESIA PREPROCEDURE EVALUATION
Anesthesia Evaluation     Patient summary reviewed and Nursing notes reviewed   no history of anesthetic complications:  NPO Solid Status: > 8 hours  NPO Liquid Status: > 2 hours           Airway   Mallampati: II  TM distance: >3 FB  Neck ROM: full  no difficulty expected  Dental - normal exam     Pulmonary - negative pulmonary ROS and normal exam   (-) COPD, asthma, not a smoker, lung cancer  Cardiovascular - negative cardio ROS and normal exam  Exercise tolerance: excellent (>7 METS)    Rhythm: regular  Rate: normal    (-) hypertension, valvular problems/murmurs, past MI, CAD, dysrhythmias, angina, CHF, cardiac stents, CABG      Neuro/Psych  (+) psychiatric history Anxiety,     (-) seizures, TIA, CVA  GI/Hepatic/Renal/Endo - negative ROS   (-) hiatal hernia, GERD, PUD, hepatitis, liver disease, no renal disease, diabetes, hypothyroidism, GI bleed    Musculoskeletal (-) negative ROS    Abdominal  - normal exam   Substance History - negative use     OB/GYN      Comment: 36wk IUP      Other - negative ROS                     Anesthesia Plan    ASA 2     epidural     Anesthetic plan, all risks, benefits, and alternatives have been provided, discussed and informed consent has been obtained with: patient.    Plan discussed with attending.

## 2019-10-28 PROBLEM — Z98.891 S/P CESAREAN SECTION: Status: ACTIVE | Noted: 2019-10-28

## 2019-10-28 LAB
ATMOSPHERIC PRESS: 754.6 MMHG
BASE EXCESS BLDCOV CALC-SCNC: -0.8 MMOL/L (ref -30–30)
BASOPHILS # BLD AUTO: 0.04 10*3/MM3 (ref 0–0.2)
BASOPHILS NFR BLD AUTO: 0.3 % (ref 0–1.5)
BDY SITE: ABNORMAL
COLLECT TME SMN: ABNORMAL
DEPRECATED RDW RBC AUTO: 41.4 FL (ref 37–54)
EOSINOPHIL # BLD AUTO: 0.04 10*3/MM3 (ref 0–0.4)
EOSINOPHIL NFR BLD AUTO: 0.3 % (ref 0.3–6.2)
ERYTHROCYTE [DISTWIDTH] IN BLOOD BY AUTOMATED COUNT: 12.1 % (ref 12.3–15.4)
GAS FLOW AIRWAY: 4 LPM
HCO3 BLDCOV-SCNC: 24.9 MMOL/L
HCT VFR BLD AUTO: 33.2 % (ref 34–46.6)
HGB BLD-MCNC: 11.5 G/DL (ref 12–15.9)
IMM GRANULOCYTES # BLD AUTO: 0.05 10*3/MM3 (ref 0–0.05)
IMM GRANULOCYTES NFR BLD AUTO: 0.4 % (ref 0–0.5)
LYMPHOCYTES # BLD AUTO: 2 10*3/MM3 (ref 0.7–3.1)
LYMPHOCYTES NFR BLD AUTO: 14.4 % (ref 19.6–45.3)
MCH RBC QN AUTO: 32 PG (ref 26.6–33)
MCHC RBC AUTO-ENTMCNC: 34.6 G/DL (ref 31.5–35.7)
MCV RBC AUTO: 92.5 FL (ref 79–97)
MODALITY: ABNORMAL
MONOCYTES # BLD AUTO: 1.26 10*3/MM3 (ref 0.1–0.9)
MONOCYTES NFR BLD AUTO: 9.1 % (ref 5–12)
NEUTROPHILS # BLD AUTO: 10.5 10*3/MM3 (ref 1.7–7)
NEUTROPHILS NFR BLD AUTO: 75.5 % (ref 42.7–76)
NOTE: ABNORMAL
NRBC BLD AUTO-RTO: 0 /100 WBC (ref 0–0.2)
PCO2 BLDCOV: 43.6 MM HG (ref 35–51.3)
PH BLDCOV: 7.36 PH UNITS (ref 7.26–7.4)
PLATELET # BLD AUTO: 180 10*3/MM3 (ref 140–450)
PMV BLD AUTO: 10.2 FL (ref 6–12)
PO2 BLDCOV: 30 MM HG (ref 19–39)
RBC # BLD AUTO: 3.59 10*6/MM3 (ref 3.77–5.28)
SAO2 % BLDCOA: 54.6 % (ref 92–99)
SAO2 % BLDCOV: ABNORMAL %
WBC NRBC COR # BLD: 13.89 10*3/MM3 (ref 3.4–10.8)

## 2019-10-28 PROCEDURE — 25010000002 FENTANYL CITRATE (PF) 100 MCG/2ML SOLUTION: Performed by: STUDENT IN AN ORGANIZED HEALTH CARE EDUCATION/TRAINING PROGRAM

## 2019-10-28 PROCEDURE — 88307 TISSUE EXAM BY PATHOLOGIST: CPT

## 2019-10-28 PROCEDURE — 25010000002 ONDANSETRON PER 1 MG: Performed by: STUDENT IN AN ORGANIZED HEALTH CARE EDUCATION/TRAINING PROGRAM

## 2019-10-28 PROCEDURE — 82803 BLOOD GASES ANY COMBINATION: CPT

## 2019-10-28 PROCEDURE — 25010000003 CEFAZOLIN IN DEXTROSE 2-4 GM/100ML-% SOLUTION: Performed by: STUDENT IN AN ORGANIZED HEALTH CARE EDUCATION/TRAINING PROGRAM

## 2019-10-28 PROCEDURE — 25010000002 MORPHINE PER 10 MG: Performed by: STUDENT IN AN ORGANIZED HEALTH CARE EDUCATION/TRAINING PROGRAM

## 2019-10-28 PROCEDURE — 63710000001 DIPHENHYDRAMINE PER 50 MG: Performed by: STUDENT IN AN ORGANIZED HEALTH CARE EDUCATION/TRAINING PROGRAM

## 2019-10-28 PROCEDURE — 25010000002 PHENYLEPHRINE PER 1 ML: Performed by: STUDENT IN AN ORGANIZED HEALTH CARE EDUCATION/TRAINING PROGRAM

## 2019-10-28 PROCEDURE — 59510 CESAREAN DELIVERY: CPT | Performed by: OBSTETRICS & GYNECOLOGY

## 2019-10-28 PROCEDURE — 25010000002 AZITHROMYCIN PER 500 MG: Performed by: OBSTETRICS & GYNECOLOGY

## 2019-10-28 PROCEDURE — 85025 COMPLETE CBC W/AUTO DIFF WBC: CPT | Performed by: OBSTETRICS & GYNECOLOGY

## 2019-10-28 RX ORDER — METHYLERGONOVINE MALEATE 0.2 MG/ML
200 INJECTION INTRAVENOUS ONCE AS NEEDED
Status: DISCONTINUED | OUTPATIENT
Start: 2019-10-28 | End: 2019-11-01 | Stop reason: HOSPADM

## 2019-10-28 RX ORDER — OXYTOCIN-SODIUM CHLORIDE 0.9% IV SOLN 30 UNIT/500ML 30-0.9/5 UT/ML-%
999 SOLUTION INTRAVENOUS ONCE
Status: COMPLETED | OUTPATIENT
Start: 2019-10-28 | End: 2019-10-28

## 2019-10-28 RX ORDER — OXYCODONE HYDROCHLORIDE AND ACETAMINOPHEN 5; 325 MG/1; MG/1
2 TABLET ORAL EVERY 4 HOURS PRN
Status: DISCONTINUED | OUTPATIENT
Start: 2019-10-28 | End: 2019-11-01 | Stop reason: HOSPADM

## 2019-10-28 RX ORDER — ONDANSETRON 2 MG/ML
4 INJECTION INTRAMUSCULAR; INTRAVENOUS ONCE AS NEEDED
Status: DISCONTINUED | OUTPATIENT
Start: 2019-10-28 | End: 2019-11-01 | Stop reason: HOSPADM

## 2019-10-28 RX ORDER — OXYTOCIN-SODIUM CHLORIDE 0.9% IV SOLN 30 UNIT/500ML 30-0.9/5 UT/ML-%
125 SOLUTION INTRAVENOUS CONTINUOUS PRN
Status: COMPLETED | OUTPATIENT
Start: 2019-10-28 | End: 2019-10-28

## 2019-10-28 RX ORDER — ACETAMINOPHEN 500 MG
1000 TABLET ORAL ONCE
Status: DISCONTINUED | OUTPATIENT
Start: 2019-10-28 | End: 2019-10-28 | Stop reason: HOSPADM

## 2019-10-28 RX ORDER — NALOXONE HCL 0.4 MG/ML
0.2 VIAL (ML) INJECTION
Status: DISCONTINUED | OUTPATIENT
Start: 2019-10-28 | End: 2019-11-01 | Stop reason: HOSPADM

## 2019-10-28 RX ORDER — FENTANYL CITRATE 50 UG/ML
INJECTION, SOLUTION INTRAMUSCULAR; INTRAVENOUS
Status: COMPLETED | OUTPATIENT
Start: 2019-10-28 | End: 2019-10-28

## 2019-10-28 RX ORDER — CALCIUM CARBONATE 200(500)MG
2 TABLET,CHEWABLE ORAL EVERY 6 HOURS PRN
Status: DISCONTINUED | OUTPATIENT
Start: 2019-10-28 | End: 2019-11-01 | Stop reason: HOSPADM

## 2019-10-28 RX ORDER — FENTANYL CITRATE 50 UG/ML
25 INJECTION, SOLUTION INTRAMUSCULAR; INTRAVENOUS
Status: ACTIVE | OUTPATIENT
Start: 2019-10-28 | End: 2019-10-29

## 2019-10-28 RX ORDER — DIPHENHYDRAMINE HCL 25 MG
25 CAPSULE ORAL EVERY 4 HOURS PRN
Status: DISCONTINUED | OUTPATIENT
Start: 2019-10-28 | End: 2019-11-01 | Stop reason: HOSPADM

## 2019-10-28 RX ORDER — GLYCOPYRROLATE 0.2 MG/ML
INJECTION INTRAMUSCULAR; INTRAVENOUS AS NEEDED
Status: DISCONTINUED | OUTPATIENT
Start: 2019-10-28 | End: 2019-10-28 | Stop reason: SURG

## 2019-10-28 RX ORDER — CEFAZOLIN SODIUM 2 G/100ML
2 INJECTION, SOLUTION INTRAVENOUS ONCE
Status: DISCONTINUED | OUTPATIENT
Start: 2019-10-28 | End: 2019-10-28 | Stop reason: HOSPADM

## 2019-10-28 RX ORDER — ONDANSETRON 4 MG/1
4 TABLET, FILM COATED ORAL EVERY 8 HOURS PRN
Status: DISCONTINUED | OUTPATIENT
Start: 2019-10-28 | End: 2019-11-01 | Stop reason: HOSPADM

## 2019-10-28 RX ORDER — BISACODYL 10 MG
10 SUPPOSITORY, RECTAL RECTAL DAILY PRN
Status: DISCONTINUED | OUTPATIENT
Start: 2019-10-28 | End: 2019-11-01 | Stop reason: HOSPADM

## 2019-10-28 RX ORDER — SIMETHICONE 80 MG
80 TABLET,CHEWABLE ORAL 4 TIMES DAILY PRN
Status: DISCONTINUED | OUTPATIENT
Start: 2019-10-28 | End: 2019-11-01 | Stop reason: HOSPADM

## 2019-10-28 RX ORDER — OXYTOCIN-SODIUM CHLORIDE 0.9% IV SOLN 30 UNIT/500ML 30-0.9/5 UT/ML-%
250 SOLUTION INTRAVENOUS CONTINUOUS
Status: ACTIVE | OUTPATIENT
Start: 2019-10-28 | End: 2019-10-28

## 2019-10-28 RX ORDER — ONDANSETRON 2 MG/ML
4 INJECTION INTRAMUSCULAR; INTRAVENOUS EVERY 6 HOURS PRN
Status: DISCONTINUED | OUTPATIENT
Start: 2019-10-28 | End: 2019-11-01 | Stop reason: HOSPADM

## 2019-10-28 RX ORDER — MISOPROSTOL 200 UG/1
800 TABLET ORAL AS NEEDED
Status: DISCONTINUED | OUTPATIENT
Start: 2019-10-28 | End: 2019-10-28 | Stop reason: HOSPADM

## 2019-10-28 RX ORDER — HYDROXYZINE 50 MG/1
50 TABLET, FILM COATED ORAL EVERY 6 HOURS PRN
Status: DISCONTINUED | OUTPATIENT
Start: 2019-10-28 | End: 2019-11-01 | Stop reason: HOSPADM

## 2019-10-28 RX ORDER — FAMOTIDINE 10 MG/ML
20 INJECTION, SOLUTION INTRAVENOUS ONCE AS NEEDED
Status: DISCONTINUED | OUTPATIENT
Start: 2019-10-28 | End: 2019-10-28 | Stop reason: HOSPADM

## 2019-10-28 RX ORDER — DIPHENHYDRAMINE HYDROCHLORIDE 50 MG/ML
25 INJECTION INTRAMUSCULAR; INTRAVENOUS EVERY 4 HOURS PRN
Status: DISCONTINUED | OUTPATIENT
Start: 2019-10-28 | End: 2019-11-01 | Stop reason: HOSPADM

## 2019-10-28 RX ORDER — MORPHINE SULFATE 1 MG/ML
INJECTION, SOLUTION EPIDURAL; INTRATHECAL; INTRAVENOUS
Status: COMPLETED
Start: 2019-10-28 | End: 2019-10-28

## 2019-10-28 RX ORDER — IBUPROFEN 800 MG/1
800 TABLET ORAL EVERY 8 HOURS PRN
Status: DISCONTINUED | OUTPATIENT
Start: 2019-10-28 | End: 2019-11-01 | Stop reason: HOSPADM

## 2019-10-28 RX ORDER — SODIUM CHLORIDE 0.9 % (FLUSH) 0.9 %
3 SYRINGE (ML) INJECTION EVERY 12 HOURS SCHEDULED
Status: DISCONTINUED | OUTPATIENT
Start: 2019-10-28 | End: 2019-11-01 | Stop reason: HOSPADM

## 2019-10-28 RX ORDER — HYDROMORPHONE HYDROCHLORIDE 1 MG/ML
0.25 INJECTION, SOLUTION INTRAMUSCULAR; INTRAVENOUS; SUBCUTANEOUS
Status: ACTIVE | OUTPATIENT
Start: 2019-10-28 | End: 2019-10-29

## 2019-10-28 RX ORDER — MISOPROSTOL 200 UG/1
600 TABLET ORAL ONCE AS NEEDED
Status: DISCONTINUED | OUTPATIENT
Start: 2019-10-28 | End: 2019-11-01 | Stop reason: HOSPADM

## 2019-10-28 RX ORDER — CARBOPROST TROMETHAMINE 250 UG/ML
250 INJECTION, SOLUTION INTRAMUSCULAR AS NEEDED
Status: DISCONTINUED | OUTPATIENT
Start: 2019-10-28 | End: 2019-10-28 | Stop reason: HOSPADM

## 2019-10-28 RX ORDER — BISACODYL 5 MG/1
10 TABLET, DELAYED RELEASE ORAL DAILY PRN
Status: DISCONTINUED | OUTPATIENT
Start: 2019-10-28 | End: 2019-11-01 | Stop reason: HOSPADM

## 2019-10-28 RX ORDER — METHYLERGONOVINE MALEATE 0.2 MG/ML
200 INJECTION INTRAVENOUS ONCE AS NEEDED
Status: DISCONTINUED | OUTPATIENT
Start: 2019-10-28 | End: 2019-10-28 | Stop reason: HOSPADM

## 2019-10-28 RX ORDER — CARBOPROST TROMETHAMINE 250 UG/ML
250 INJECTION, SOLUTION INTRAMUSCULAR ONCE AS NEEDED
Status: DISCONTINUED | OUTPATIENT
Start: 2019-10-28 | End: 2019-11-01 | Stop reason: HOSPADM

## 2019-10-28 RX ORDER — MORPHINE SULFATE 1 MG/ML
INJECTION, SOLUTION EPIDURAL; INTRATHECAL; INTRAVENOUS
Status: COMPLETED | OUTPATIENT
Start: 2019-10-28 | End: 2019-10-28

## 2019-10-28 RX ORDER — CEFAZOLIN SODIUM 2 G/100ML
INJECTION, SOLUTION INTRAVENOUS AS NEEDED
Status: DISCONTINUED | OUTPATIENT
Start: 2019-10-28 | End: 2019-10-28 | Stop reason: SURG

## 2019-10-28 RX ORDER — ONDANSETRON 2 MG/ML
INJECTION INTRAMUSCULAR; INTRAVENOUS AS NEEDED
Status: DISCONTINUED | OUTPATIENT
Start: 2019-10-28 | End: 2019-10-28 | Stop reason: SURG

## 2019-10-28 RX ORDER — OXYCODONE HYDROCHLORIDE AND ACETAMINOPHEN 5; 325 MG/1; MG/1
1 TABLET ORAL EVERY 4 HOURS PRN
Status: DISCONTINUED | OUTPATIENT
Start: 2019-10-28 | End: 2019-11-01 | Stop reason: HOSPADM

## 2019-10-28 RX ORDER — LIDOCAINE HYDROCHLORIDE 10 MG/ML
5 INJECTION, SOLUTION EPIDURAL; INFILTRATION; INTRACAUDAL; PERINEURAL AS NEEDED
Status: DISCONTINUED | OUTPATIENT
Start: 2019-10-28 | End: 2019-11-01 | Stop reason: HOSPADM

## 2019-10-28 RX ORDER — ERYTHROMYCIN 5 MG/G
OINTMENT OPHTHALMIC
Status: DISPENSED
Start: 2019-10-28 | End: 2019-10-28

## 2019-10-28 RX ORDER — ACETAMINOPHEN 500 MG
1000 TABLET ORAL ONCE
Status: COMPLETED | OUTPATIENT
Start: 2019-10-28 | End: 2019-10-28

## 2019-10-28 RX ORDER — PHYTONADIONE 2 MG/ML
INJECTION, EMULSION INTRAMUSCULAR; INTRAVENOUS; SUBCUTANEOUS
Status: DISPENSED
Start: 2019-10-28 | End: 2019-10-28

## 2019-10-28 RX ORDER — FENTANYL CITRATE 50 UG/ML
INJECTION, SOLUTION INTRAMUSCULAR; INTRAVENOUS
Status: COMPLETED
Start: 2019-10-28 | End: 2019-10-28

## 2019-10-28 RX ORDER — TRANEXAMIC ACID 100 MG/ML
1000 INJECTION, SOLUTION INTRAVENOUS ONCE AS NEEDED
Status: DISCONTINUED | OUTPATIENT
Start: 2019-10-28 | End: 2019-10-28 | Stop reason: HOSPADM

## 2019-10-28 RX ORDER — SODIUM CHLORIDE 0.9 % (FLUSH) 0.9 %
10 SYRINGE (ML) INJECTION AS NEEDED
Status: DISCONTINUED | OUTPATIENT
Start: 2019-10-28 | End: 2019-11-01 | Stop reason: HOSPADM

## 2019-10-28 RX ADMIN — ACETAMINOPHEN 1000 MG: 500 TABLET, FILM COATED ORAL at 01:21

## 2019-10-28 RX ADMIN — FAMOTIDINE 20 MG: 10 INJECTION INTRAVENOUS at 01:21

## 2019-10-28 RX ADMIN — FENTANYL CITRATE 15 MCG: 50 INJECTION INTRAMUSCULAR; INTRAVENOUS at 01:48

## 2019-10-28 RX ADMIN — OXYCODONE AND ACETAMINOPHEN 1 TABLET: 5; 325 TABLET ORAL at 18:45

## 2019-10-28 RX ADMIN — OXYTOCIN 999 ML/HR: 10 INJECTION INTRAVENOUS at 02:02

## 2019-10-28 RX ADMIN — IBUPROFEN 800 MG: 800 TABLET ORAL at 12:15

## 2019-10-28 RX ADMIN — GLYCOPYRROLATE 0.4 MG: 0.2 INJECTION INTRAMUSCULAR; INTRAVENOUS at 01:51

## 2019-10-28 RX ADMIN — AZITHROMYCIN DIHYDRATE 500 MG: 500 INJECTION, POWDER, LYOPHILIZED, FOR SOLUTION INTRAVENOUS at 01:21

## 2019-10-28 RX ADMIN — OXYCODONE AND ACETAMINOPHEN 1 TABLET: 5; 325 TABLET ORAL at 22:44

## 2019-10-28 RX ADMIN — CEFAZOLIN SODIUM 2 G: 2 INJECTION, SOLUTION INTRAVENOUS at 01:55

## 2019-10-28 RX ADMIN — PHENYLEPHRINE HYDROCHLORIDE 100 MCG: 10 INJECTION INTRAVENOUS at 01:58

## 2019-10-28 RX ADMIN — OXYTOCIN 125 ML/HR: 10 INJECTION INTRAVENOUS at 03:48

## 2019-10-28 RX ADMIN — SODIUM CHLORIDE, PRESERVATIVE FREE 3 ML: 5 INJECTION INTRAVENOUS at 22:44

## 2019-10-28 RX ADMIN — IBUPROFEN 800 MG: 800 TABLET ORAL at 20:48

## 2019-10-28 RX ADMIN — GLYCOPYRROLATE 0.2 MG: 0.2 INJECTION INTRAMUSCULAR; INTRAVENOUS at 01:53

## 2019-10-28 RX ADMIN — MORPHINE SULFATE 100 MCG: 1 INJECTION EPIDURAL; INTRATHECAL; INTRAVENOUS at 01:48

## 2019-10-28 RX ADMIN — IBUPROFEN 800 MG: 800 TABLET ORAL at 04:31

## 2019-10-28 RX ADMIN — AZITHROMYCIN DIHYDRATE 500 MG: 500 INJECTION, POWDER, LYOPHILIZED, FOR SOLUTION INTRAVENOUS at 01:44

## 2019-10-28 RX ADMIN — ONDANSETRON 4 MG: 2 INJECTION INTRAMUSCULAR; INTRAVENOUS at 01:45

## 2019-10-28 RX ADMIN — DIPHENHYDRAMINE HYDROCHLORIDE 25 MG: 25 CAPSULE ORAL at 10:22

## 2019-10-28 RX ADMIN — PHENYLEPHRINE HYDROCHLORIDE 100 MCG: 10 INJECTION INTRAVENOUS at 01:50

## 2019-10-28 NOTE — ANESTHESIA PROCEDURE NOTES
Spinal Block      Patient reassessed immediately prior to procedure    Patient location during procedure: OR  Start Time: 10/28/2019 1:45 AM  Stop Time: 10/28/2019 1:48 AM  Indication:at surgeon's request and post-op pain management  Performed By  Anesthesiologist: Jessee Abreu MD  Preanesthetic Checklist  Completed: patient identified, site marked, surgical consent, pre-op evaluation, timeout performed, IV checked, risks and benefits discussed and monitors and equipment checked  Spinal Block Prep:  Patient Position:sitting  Sterile Tech:cap, gloves, gown, mask and sterile barriers  Prep:Chloraprep  Patient Monitoring:blood pressure monitoring, continuous pulse oximetry and EKG  Spinal Block Procedure  Approach:midline  Guidance:landmark technique  Location:L3-L4  Needle Type:Sobeida  Needle Gauge:25 G  Placement of Spinal needle event:cerebrospinal fluid aspirated  Paresthesia: no  Fluid Appearance:clear  Medications: Morphine PF injection, 100 mcg  fentaNYL citrate (PF) (SUBLIMAZE) injection, 15 mcg  Med Administered at 10/28/2019 1:48 AM   Post Assessment  Patient Tolerance:patient tolerated the procedure well with no apparent complications  Complications no

## 2019-10-28 NOTE — ANESTHESIA POSTPROCEDURE EVALUATION
Patient: Anne Marie Patel    Procedure Summary     Date:  10/28/19 Room / Location:   [facility]    Anesthesia Start:  143 Anesthesia Stop:  235    Procedure:   SECTION PRIMARY (N/A Abdomen) Diagnosis:  (intolerance to labor)    Surgeon:  Dez Durán MD Provider:  Jessee Abreu MD    Anesthesia Type:  epidural ASA Status:  2          Anesthesia Type: epidural  Last vitals  BP   123/83 (10/28/19 0029)   Temp   36.8 °C (98.3 °F) (10/27/19 2228)   Pulse   50 (10/28/19 0029)   Resp   18 (10/28/19 0029)     SpO2   97 % (10/27/19 2120)     Post Anesthesia Care and Evaluation    Patient location during evaluation: bedside  Patient participation: complete - patient participated  Level of consciousness: awake and alert  Pain management: adequate  Airway patency: patent  Anesthetic complications: No anesthetic complications  PONV Status: controlled  Cardiovascular status: blood pressure returned to baseline and acceptable  Respiratory status: acceptable  Hydration status: acceptable

## 2019-10-29 LAB
BASOPHILS # BLD AUTO: 0.04 10*3/MM3 (ref 0–0.2)
BASOPHILS NFR BLD AUTO: 0.4 % (ref 0–1.5)
DEPRECATED RDW RBC AUTO: 42.8 FL (ref 37–54)
EOSINOPHIL # BLD AUTO: 0.14 10*3/MM3 (ref 0–0.4)
EOSINOPHIL NFR BLD AUTO: 1.3 % (ref 0.3–6.2)
ERYTHROCYTE [DISTWIDTH] IN BLOOD BY AUTOMATED COUNT: 12.3 % (ref 12.3–15.4)
HCT VFR BLD AUTO: 32.6 % (ref 34–46.6)
HGB BLD-MCNC: 11.1 G/DL (ref 12–15.9)
IMM GRANULOCYTES # BLD AUTO: 0.03 10*3/MM3 (ref 0–0.05)
IMM GRANULOCYTES NFR BLD AUTO: 0.3 % (ref 0–0.5)
LYMPHOCYTES # BLD AUTO: 2.22 10*3/MM3 (ref 0.7–3.1)
LYMPHOCYTES NFR BLD AUTO: 20.9 % (ref 19.6–45.3)
MCH RBC QN AUTO: 32 PG (ref 26.6–33)
MCHC RBC AUTO-ENTMCNC: 34 G/DL (ref 31.5–35.7)
MCV RBC AUTO: 93.9 FL (ref 79–97)
MONOCYTES # BLD AUTO: 0.74 10*3/MM3 (ref 0.1–0.9)
MONOCYTES NFR BLD AUTO: 7 % (ref 5–12)
NEUTROPHILS # BLD AUTO: 7.46 10*3/MM3 (ref 1.7–7)
NEUTROPHILS NFR BLD AUTO: 70.1 % (ref 42.7–76)
NRBC BLD AUTO-RTO: 0 /100 WBC (ref 0–0.2)
PLATELET # BLD AUTO: 158 10*3/MM3 (ref 140–450)
PMV BLD AUTO: 10.1 FL (ref 6–12)
RBC # BLD AUTO: 3.47 10*6/MM3 (ref 3.77–5.28)
WBC NRBC COR # BLD: 10.63 10*3/MM3 (ref 3.4–10.8)

## 2019-10-29 PROCEDURE — 85025 COMPLETE CBC W/AUTO DIFF WBC: CPT | Performed by: OBSTETRICS & GYNECOLOGY

## 2019-10-29 RX ORDER — DOCUSATE SODIUM 100 MG/1
100 CAPSULE, LIQUID FILLED ORAL 2 TIMES DAILY
Status: DISCONTINUED | OUTPATIENT
Start: 2019-10-29 | End: 2019-11-01 | Stop reason: HOSPADM

## 2019-10-29 RX ADMIN — SIMETHICONE CHEW TAB 80 MG 80 MG: 80 TABLET ORAL at 08:19

## 2019-10-29 RX ADMIN — OXYCODONE AND ACETAMINOPHEN 1 TABLET: 5; 325 TABLET ORAL at 12:56

## 2019-10-29 RX ADMIN — OXYCODONE AND ACETAMINOPHEN 1 TABLET: 5; 325 TABLET ORAL at 17:07

## 2019-10-29 RX ADMIN — IBUPROFEN 800 MG: 800 TABLET ORAL at 23:45

## 2019-10-29 RX ADMIN — IBUPROFEN 800 MG: 800 TABLET ORAL at 15:40

## 2019-10-29 RX ADMIN — DOCUSATE SODIUM 100 MG: 100 CAPSULE, LIQUID FILLED ORAL at 21:42

## 2019-10-29 RX ADMIN — OXYCODONE AND ACETAMINOPHEN 1 TABLET: 5; 325 TABLET ORAL at 03:34

## 2019-10-29 RX ADMIN — OXYCODONE AND ACETAMINOPHEN 1 TABLET: 5; 325 TABLET ORAL at 08:19

## 2019-10-29 RX ADMIN — OXYCODONE AND ACETAMINOPHEN 1 TABLET: 5; 325 TABLET ORAL at 21:42

## 2019-10-29 RX ADMIN — IBUPROFEN 800 MG: 800 TABLET ORAL at 06:32

## 2019-10-30 RX ADMIN — DOCUSATE SODIUM 100 MG: 100 CAPSULE, LIQUID FILLED ORAL at 20:20

## 2019-10-30 RX ADMIN — DOCUSATE SODIUM 100 MG: 100 CAPSULE, LIQUID FILLED ORAL at 09:01

## 2019-10-30 RX ADMIN — OXYCODONE AND ACETAMINOPHEN 1 TABLET: 5; 325 TABLET ORAL at 11:48

## 2019-10-30 RX ADMIN — OXYCODONE AND ACETAMINOPHEN 1 TABLET: 5; 325 TABLET ORAL at 06:07

## 2019-10-30 RX ADMIN — IBUPROFEN 800 MG: 800 TABLET ORAL at 18:30

## 2019-10-30 RX ADMIN — OXYCODONE AND ACETAMINOPHEN 1 TABLET: 5; 325 TABLET ORAL at 16:10

## 2019-10-30 RX ADMIN — OXYCODONE AND ACETAMINOPHEN 1 TABLET: 5; 325 TABLET ORAL at 20:20

## 2019-10-30 RX ADMIN — OXYCODONE AND ACETAMINOPHEN 1 TABLET: 5; 325 TABLET ORAL at 01:50

## 2019-10-30 RX ADMIN — IBUPROFEN 800 MG: 800 TABLET ORAL at 09:02

## 2019-10-31 RX ADMIN — DOCUSATE SODIUM 100 MG: 100 CAPSULE, LIQUID FILLED ORAL at 08:48

## 2019-10-31 RX ADMIN — DOCUSATE SODIUM 100 MG: 100 CAPSULE, LIQUID FILLED ORAL at 21:52

## 2019-10-31 RX ADMIN — IBUPROFEN 800 MG: 800 TABLET ORAL at 04:31

## 2019-10-31 RX ADMIN — IBUPROFEN 800 MG: 800 TABLET ORAL at 21:52

## 2019-10-31 RX ADMIN — OXYCODONE AND ACETAMINOPHEN 1 TABLET: 5; 325 TABLET ORAL at 00:19

## 2019-10-31 RX ADMIN — OXYCODONE AND ACETAMINOPHEN 1 TABLET: 5; 325 TABLET ORAL at 08:48

## 2019-10-31 RX ADMIN — OXYCODONE AND ACETAMINOPHEN 1 TABLET: 5; 325 TABLET ORAL at 04:31

## 2019-10-31 RX ADMIN — IBUPROFEN 800 MG: 800 TABLET ORAL at 13:20

## 2019-10-31 RX ADMIN — OXYCODONE AND ACETAMINOPHEN 1 TABLET: 5; 325 TABLET ORAL at 17:36

## 2019-10-31 RX ADMIN — OXYCODONE AND ACETAMINOPHEN 1 TABLET: 5; 325 TABLET ORAL at 21:52

## 2019-10-31 RX ADMIN — OXYCODONE AND ACETAMINOPHEN 1 TABLET: 5; 325 TABLET ORAL at 13:20

## 2019-10-31 RX ADMIN — MAGNESIUM HYDROXIDE 10 ML: 2400 SUSPENSION ORAL at 08:48

## 2019-11-01 VITALS
SYSTOLIC BLOOD PRESSURE: 123 MMHG | TEMPERATURE: 98 F | HEART RATE: 59 BPM | WEIGHT: 130 LBS | HEIGHT: 64 IN | BODY MASS INDEX: 22.2 KG/M2 | DIASTOLIC BLOOD PRESSURE: 80 MMHG | RESPIRATION RATE: 16 BRPM | OXYGEN SATURATION: 99 %

## 2019-11-01 PROCEDURE — 90471 IMMUNIZATION ADMIN: CPT | Performed by: OBSTETRICS & GYNECOLOGY

## 2019-11-01 PROCEDURE — 90707 MMR VACCINE SC: CPT | Performed by: OBSTETRICS & GYNECOLOGY

## 2019-11-01 PROCEDURE — 25010000002 MEASLES, MUMPS & RUBELLA VAC RECONSTITUTED SOLUTION: Performed by: OBSTETRICS & GYNECOLOGY

## 2019-11-01 RX ORDER — IBUPROFEN 800 MG/1
800 TABLET ORAL EVERY 8 HOURS PRN
Qty: 30 TABLET | Refills: 0 | Status: SHIPPED | OUTPATIENT
Start: 2019-11-01 | End: 2019-12-09

## 2019-11-01 RX ORDER — OXYCODONE HYDROCHLORIDE AND ACETAMINOPHEN 5; 325 MG/1; MG/1
1 TABLET ORAL EVERY 4 HOURS PRN
Qty: 30 TABLET | Refills: 0 | Status: SHIPPED | OUTPATIENT
Start: 2019-11-01 | End: 2019-11-08

## 2019-11-01 RX ADMIN — MEASLES, MUMPS, AND RUBELLA VIRUS VACCINE LIVE 0.5 ML: 1000; 12500; 1000 INJECTION, POWDER, LYOPHILIZED, FOR SUSPENSION SUBCUTANEOUS at 14:14

## 2019-11-01 RX ADMIN — DOCUSATE SODIUM 100 MG: 100 CAPSULE, LIQUID FILLED ORAL at 08:22

## 2019-11-01 RX ADMIN — OXYCODONE AND ACETAMINOPHEN 1 TABLET: 5; 325 TABLET ORAL at 10:23

## 2019-11-01 RX ADMIN — SIMETHICONE CHEW TAB 80 MG 80 MG: 80 TABLET ORAL at 08:22

## 2019-11-01 RX ADMIN — OXYCODONE AND ACETAMINOPHEN 1 TABLET: 5; 325 TABLET ORAL at 05:55

## 2019-11-01 RX ADMIN — OXYCODONE AND ACETAMINOPHEN 1 TABLET: 5; 325 TABLET ORAL at 14:13

## 2019-11-01 RX ADMIN — IBUPROFEN 800 MG: 800 TABLET ORAL at 05:55

## 2019-11-01 RX ADMIN — IBUPROFEN 800 MG: 800 TABLET ORAL at 14:13

## 2019-11-01 RX ADMIN — OXYCODONE AND ACETAMINOPHEN 1 TABLET: 5; 325 TABLET ORAL at 01:45

## 2019-11-01 NOTE — DISCHARGE SUMMARY
section Discharge Summary    Date of Admission: 10/25/2019  Date of Discharge:  2019      Patient: Anne Marie Patel      MR#:[id number]    Surgeon/OB: Dez Durán MD    Discharge Diagnosis:  section at 37w0d, uncomplicated recovery    Procedures:  , Low Transverse     10/28/2019    2:01 AM      Anesthesia:  Spinal     Presenting Problem/History of Present Illness  Pregnancy [Z34.90]  S/P  section [Z98.891]     Patient Active Problem List   Diagnosis   • Pregnancy   • Need for measles-mumps-rubella (MMR) vaccine   • Small for gestational age (SGA)   • IUGR (intrauterine growth retardation) affecting mother, third trimester, not applicable or unspecified fetus   • S/P  section       Hospital Course  Patient is a 24 y.o. female  at 37w0d status post  section with uneventful postoperative recovery.  Baby is in the NICU.  Patient was induced for IUGR with abnormal Dopplers.  Baby had late decelerations with contractions and  section was done.  Patient was advanced to regular diet on postoperative day#1.  On discharge, ambulating, tolerating a regular diet without any difficulties and her incision is dry, clean and intact.  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    Infant:   female  fetus 2205 g (4 lb 13.8 oz)  with Apgar scores of 8  , 9   at five minutes.    Condition on Discharge:  Stable    Vital Signs  Temp:  [97 °F (36.1 °C)-98 °F (36.7 °C)] 98 °F (36.7 °C)  Heart Rate:  [59-69] 59  Resp:  [16-18] 16  BP: (111-123)/(69-80) 123/80    Lab Results   Component Value Date    WBC 10.63 10/29/2019    HGB 11.1 (L) 10/29/2019    HCT 32.6 (L) 10/29/2019    MCV 93.9 10/29/2019     10/29/2019       Discharge Disposition  Home or Self Care    Discharge Medications     Your medication list      START taking these medications      Instructions Last Dose Given Next Dose Due   ibuprofen 800 MG tablet  Commonly known as:   ADVIL,MOTRIN      Take 1 tablet by mouth Every 8 (Eight) Hours As Needed for Mild Pain .       oxyCODONE-acetaminophen 5-325 MG per tablet  Commonly known as:  PERCOCET      Take 1 tablet by mouth Every 4 (Four) Hours As Needed for Moderate Pain  for up to 6 days.       sertraline 50 MG tablet  Commonly known as:  ZOLOFT      Take 1 tablet by mouth Daily.          CONTINUE taking these medications      Instructions Last Dose Given Next Dose Due   CLARITIN 10 MG capsule  Generic drug:  Loratadine      Take  by mouth.       fish oil 1000 MG capsule capsule      Take  by mouth Daily With Breakfast.       PNV PO      Take  by mouth.          STOP taking these medications    esomeprazole 40 MG capsule  Commonly known as:  nexIUM              Where to Get Your Medications      These medications were sent to [facility] - [place] [id number]    Hours:  7:00AM-6PM Mon-Fri Phone:  [phone]   · ibuprofen 800 MG tablet  · oxyCODONE-acetaminophen 5-325 MG per tablet  · sertraline 50 MG tablet           Discharge Diet: Regular    Follow-up Appointments  Future Appointments   Date Time Provider Department Center   11/8/2019  9:00 AM Phillip Palomo MD [facility] None         Prenatal labs/vax:     Phillip Palomo MD  11/1/2019  10:08 AM

## 2019-11-01 NOTE — PROGRESS NOTES
Psychiatric   PROGRESS NOTE    Post-Op Day 4 S/P     Subjective   CC: post  section    Patient reports:  Baby is still in the NICU but is progressing well.  Patient is going to become a border.  Pain is well controlled with Motrin and Percocet.  She is passing gas and ambulating without assistance.  Patient has had some episodes of teary nervous.  She might consider taking Zoloft.  She does want to do a physical therapy referral at 6 weeks for her pelvic floor.    Review of systems- no shortness of breath, nausea or vomiting or leg pain.    Objective      Vitals: Vital Signs Range for the last 24 hours  Temperature: Temp:  [97 °F (36.1 °C)-98 °F (36.7 °C)] 98 °F (36.7 °C)       BP: BP: (111-123)/(69-80) 123/80   Pulse: Heart Rate:  [59-69] 59   Respirations: Resp:  [16-18] 16                            Physical exam   General-  no acute distress, conversant    Lungs- respirations unlabored   CV- trace LE edema   Abdomen- soft, nontender, nondistended.  Fundus is firm.   Incision -  Clean, dry, and intact with no erythema.   Lower extremities- nontender bilaterally    Results from last 7 days   Lab Units 10/29/19  0657   WBC 10*3/mm3 10.63   HEMOGLOBIN g/dL 11.1*   HEMATOCRIT % 32.6*   PLATELETS 10*3/mm3 158         Assessment/Plan        Pregnancy    IUGR (intrauterine growth retardation) affecting mother, third trimester, not applicable or unspecified fetus    S/P  section      Assessment:    Anne Marie Patel is Day 4  post-op from     Patient is doing well overall.  We discussed Zoloft.  She will take a prescription and may start it.  Add abdominal binder.  Patient needs her MMR booster which will be given today.  Patient will become a border today.  She was seen in the office in 2 and 6 weeks.  Postpartum restrictions including pelvic rest no heavy lifting and no driving were discussed.     Plan:  pain medication prn and encouraged ambulation.        Phillip Palomo  MD  11/1/2019  9:51 AM

## 2019-11-05 ENCOUNTER — TRANSCRIBE ORDERS (OUTPATIENT)
Dept: ADMINISTRATIVE | Facility: HOSPITAL | Age: 24
End: 2019-11-05

## 2019-11-05 DIAGNOSIS — O92.70 LACTATION DISORDER: Primary | ICD-10-CM

## 2019-11-06 ENCOUNTER — HOSPITAL ENCOUNTER (OUTPATIENT)
Dept: LACTATION | Facility: HOSPITAL | Age: 24
Discharge: HOME | End: 2019-11-06

## 2019-11-06 NOTE — LACTATION NOTE
P1. Baby Celine is a NICU grad who is doing very well after two days at home. Mom states her suckle is mild to moderate  And she usually takes both breasts. Mom feels softening after baby nurses. Once a day baby gets neosure and mom pumps.    2326 in 12 gram diaper  2402 post-feed  76 cc taken , left 15 , right 9    Will start weaning from pump p.c. And offer bottle for dessert every other  As baby transfers very well on her own.  Lactation Consult Note    Evaluation Completed: 2019 12:01 PM  Patient Name: Anne Marie Patel  :  1995  MRN:  [id number]     REFERRAL  INFORMATION:                          Date of Referral: 19   Person Making Referral: patient  Maternal Reason for Referral: breastfeeding currently  Infant Reason for Referral: 35-37 weeks gestation, low birth weight, NICU admission    DELIVERY HISTORY:          Skin to skin initiation date/time: 10/28/2019  2:00 AM   Skin to skin end date/time:              MATERNAL ASSESSMENT:  Breast Size Issue: none (19 1122 : Michelle Kwan RN)  Breast Shape: round (19 1122 : Michelle Kwan, RN)  Breast Density: full (19 1122 : Michelle Kwan RN)  Areola: dense (19 1122 : Michelle Kwan, RN)  Nipples: everted (19 1122 : Michelle Kwan, RN)                INFANT ASSESSMENT:  Information for the patient's :  Celine Patel [[id number]]   No past medical history on file.                                                                                                                                MATERNAL INFANT FEEDING:  Maternal Preparation: breast care, hand hygiene (19 1122 : Michelle Kwan, RN)  Maternal Emotional State: independent, relaxed (19 1122 : Michelle Kwan, RN)  Infant Positioning: cross-cradle (19 1122 : Michelle Kwan RN)   Signs of Milk Transfer: audible swallow, breasts soften with feeding, infant jaw motion present, suck/swallow ratio  (11/06/19 1122 : Klausing, Michelle M, RN)  Pain with Feeding: no (11/06/19 1122 : Michelle Kwan RN)           Milk Ejection Reflex: present (11/06/19 1122 : Michelle Kwan RN)  Comfort Measures Following Feeding: air-drying encouraged, expressed milk applied, water cleansing encouraged (11/06/19 1122 : Michelle Kwan RN)        Latch Assistance: no (11/06/19 1122 : Michelle Kwan RN)                               EQUIPMENT TYPE:  Breast Pump Type: double electric, personal (11/06/19 1122 : Michelle Kwan RN)  Breast Pump Flange Type: hard (11/06/19 1122 : Michelle Kwan RN)  Breast Pump Flange Size: 24 mm (11/06/19 1122 : Michelle Kwan RN)                        BREAST PUMPING:  Breast Pumping Interventions: early pumping promoted (11/06/19 1122 : Michelle Kwan RN)  Breast Pumping: bilateral breasts pumped until soft, double electric breast pump utilized (11/06/19 1122 : Michelle Kwan RN)    LACTATION REFERRALS:

## 2019-11-08 ENCOUNTER — POSTPARTUM VISIT (OUTPATIENT)
Dept: OBSTETRICS AND GYNECOLOGY | Age: 24
End: 2019-11-08

## 2019-11-08 VITALS
HEIGHT: 64 IN | DIASTOLIC BLOOD PRESSURE: 58 MMHG | WEIGHT: 112.8 LBS | BODY MASS INDEX: 19.26 KG/M2 | SYSTOLIC BLOOD PRESSURE: 100 MMHG

## 2019-11-08 DIAGNOSIS — Z98.891 S/P CESAREAN SECTION: Primary | ICD-10-CM

## 2019-11-08 DIAGNOSIS — Z98.890 POST-OPERATIVE STATE: ICD-10-CM

## 2019-11-08 PROBLEM — Z34.90 PREGNANCY: Status: RESOLVED | Noted: 2019-07-11 | Resolved: 2019-11-08

## 2019-11-08 PROBLEM — Z23 NEED FOR MEASLES-MUMPS-RUBELLA (MMR) VACCINE: Status: RESOLVED | Noted: 2019-07-11 | Resolved: 2019-11-08

## 2019-11-08 PROBLEM — O36.5930 IUGR (INTRAUTERINE GROWTH RETARDATION) AFFECTING MOTHER, THIRD TRIMESTER, NOT APPLICABLE OR UNSPECIFIED FETUS: Status: RESOLVED | Noted: 2019-10-25 | Resolved: 2019-11-08

## 2019-11-08 PROCEDURE — 0503F POSTPARTUM CARE VISIT: CPT | Performed by: OBSTETRICS & GYNECOLOGY

## 2019-11-08 NOTE — PROGRESS NOTES
"Subjective   Chief Complaint   Patient presents with   • Postpartum Care     incision check     Anne Marie Patel is a 24 y.o. year old  presenting to be seen for her post-operative visit.  Currently she reports no problems with eating, bowel movements, voiding, or wound drainage and pain is well controlled.  She was delivered for IUGR.  Baby had decelerations during labor and she had a .  Baby was in the NICU.  Baby is with her today and is doing well and gaining weight.  Patient did have some depression symptoms and tearfulness at the hospital.  I gave her prescription for Zoloft but she has not started it yet.  She is considering.  She is feeling somewhat better.    There was no pathology result associated with Anne Marie's recent procedure.      OTHER THINGS SHE WANTS TO DISCUSS TODAY:  Nothing else    The following portions of the patient's history were reviewed and updated as appropriate:current medications and allergies       Objective   /58   Ht 162.6 cm (64\")   Wt 51.2 kg (112 lb 12.8 oz)   Breastfeeding? Yes   BMI 19.36 kg/m²     General:  well developed; well nourished  no acute distress   Abdomen: soft, non-tender; no masses  incision is clean, dry, intact and without drainage   Pelvis: Not performed.          Assessment   1. S/P      Plan   1. Avoid tampons, douching and intercourse  2. The importance of keeping all planned follow-up and taking all medications as prescribed was emphasized.  3. Follow up for annual exam and for postpartum visit 3 week.    No orders of the defined types were placed in this encounter.             Note: Speech recognition transcription software may have been used to create portions of this document.  An attempt at proofreading has been made but errors in transcription could still be present.    "

## 2019-12-09 ENCOUNTER — POSTPARTUM VISIT (OUTPATIENT)
Dept: OBSTETRICS AND GYNECOLOGY | Age: 24
End: 2019-12-09

## 2019-12-09 VITALS
WEIGHT: 111 LBS | SYSTOLIC BLOOD PRESSURE: 108 MMHG | BODY MASS INDEX: 18.95 KG/M2 | DIASTOLIC BLOOD PRESSURE: 74 MMHG | HEIGHT: 64 IN

## 2019-12-09 DIAGNOSIS — Z12.4 SCREENING FOR MALIGNANT NEOPLASM OF THE CERVIX: ICD-10-CM

## 2019-12-09 PROCEDURE — 0503F POSTPARTUM CARE VISIT: CPT | Performed by: OBSTETRICS & GYNECOLOGY

## 2019-12-09 NOTE — PROGRESS NOTES
"Subjective   Anne Marie Patel is a 24 y.o. female who presents for a postpartum visit. She is 6 weeks postpartum following a spontaneous vaginal delivery. I have fully reviewed the prenatal and intrapartum course. Postpartum course has been uneventful. Baby is feeding by breast. Bleeding has been normal in amount and decreasing. Bowel function is normal. Bladder function is normal. Patient not sexually active at this time. Contraception method is discussed. Postpartum depression screening: negative.    The following portions of the patient's history were reviewed and updated as appropriate: allergies, current medications,and problem list.    Pathology is reviewed.  Placenta was small at 3rd percentile and decidual necrosis was noted.  We discussed possible recurrence in future pregnancies.    Review of Systems  Pertinent items are noted in HPI.    Objective   /74   Ht 162.6 cm (64\")   Wt 50.3 kg (111 lb)   Breastfeeding Yes   BMI 19.05 kg/m²    General:  Alert and oriented, NAD    Breasts:         Heart:     Abdomen: Normal findings, nontender    Vulva: Normal, well-healed    Vagina: No lesions or abnormal discharge   Cervix:  Normal with no cervical motion tenderness   Corpus: Normal for post partum visit   Adnexa:  Non tender, non enlarged         Assessment/Plan     Normal postpartum exam. Pap smear done at today's visit.    1. Contraception: condoms  2. Slow return to normal activities reviewed. Continue prenatal vitamins.  3. Follow up in 12 months or sooner as needed.  4.  IUGR this pregnancy with placental pathology.  Discussed need for  testing and ultrasound monitoring of fetal weight in future pregnancies.  5.  Continue Zoloft for depression.           "

## 2019-12-11 LAB
CONV .: NORMAL
CYTOLOGIST CVX/VAG CYTO: NORMAL
CYTOLOGY CVX/VAG DOC CYTO: NORMAL
CYTOLOGY CVX/VAG DOC THIN PREP: NORMAL
DX ICD CODE: NORMAL
HIV 1 & 2 AB SER-IMP: NORMAL
OTHER STN SPEC: NORMAL
STAT OF ADQ CVX/VAG CYTO-IMP: NORMAL

## 2019-12-12 ENCOUNTER — TELEPHONE (OUTPATIENT)
Dept: OBSTETRICS AND GYNECOLOGY | Age: 24
End: 2019-12-12

## 2019-12-12 NOTE — TELEPHONE ENCOUNTER
Health Maintenance Summary     Topic Due On Due Status Completed On    IMMUNIZATION - IPV Jul 20, 2017 Overdue     IMMUNIZATION - MMR May 20, 2018 Not Due     IMMUNIZATION - VARICELLA May 20, 2018 Not Due     IMMUNIZATION - PNEUMOCOCCAL Jul 20, 2017 Overdue     IMMUNIZATION - HEPATITIS B May 20, 2017 Overdue     IMMUNIZATION - HIB Jul 20, 2017 Overdue     IMMUNIZATION - ROTAVIRUS  Aged Out     IMMUNIZATION - HEPATITIS A May 20, 2018 Not Due     IMMUNIZATION - MENINGITIS May 20, 2028 Not Due     IMMUNIZATION - DTaP/Tdap/Td Jul 20, 2017 Overdue           Patient is due for topics as listed above, she wishes to discuss with provider .     Lm,pap normal

## 2019-12-12 NOTE — TELEPHONE ENCOUNTER
----- Message from Phillip Palomo MD sent at 12/12/2019  8:15 AM EST -----  Please notify pap is normal.

## 2020-03-10 ENCOUNTER — HOSPITAL ENCOUNTER (OUTPATIENT)
Dept: LACTATION | Facility: HOSPITAL | Age: 25
Discharge: HOME | End: 2020-03-10

## 2020-03-10 NOTE — LACTATION NOTE
Mom here today because she feels like her supply has dropped. Mom has gone back to work and starting exercising. Baby BF for 20 min today and transferred 2.1 oz. Discussed ways to increase supply. Mom reports she drinks 100 oz of  Water a day. She cont to supplement baby with 1-2 formula neosure bottles a day per pediatrician advice. Mom will start pumping 3-4 times a day after BF to help with increasing supply. She is eating lactation cookies and starting pumping once at night 2 days ago. F/u next week  Baby's weight today 12-5.7  Lactation Consult Note    Evaluation Completed: 3/10/2020 16:20  Patient Name: Anne Marie Patel  :  1995  MRN:  [id number]     REFERRAL  INFORMATION:                          Date of Referral: 03/10/20   Person Making Referral: patient          DELIVERY HISTORY:  This patient has no babies on file.  This patient has no babies on file.  Skin to skin initiation date/time: This patient has no babies on file. This patient has no babies on file.  Skin to skin end date/time: This patient has no babies on file. This patient has no babies on file.  This patient has no babies on file.    MATERNAL ASSESSMENT:     Breast Shape: round (03/10/20 1500 : Bianca Keller RN)  Breast Density: full (03/10/20 1500 : Bianca Keller RN)  Areola: elastic (03/10/20 1500 : Bianca Keller RN)  Nipples: everted (03/10/20 1500 : Bianca Keller RN)                INFANT ASSESSMENT:  This patient has no babies on file.  This patient has no babies on file.  This patient has no babies on file.  This patient has no babies on file.  This patient has no babies on file.  This patient has no babies on file.  This patient has no babies on file.  This patient has no babies on file.  This patient has no babies on file.  This patient has no babies on file.  This patient has no babies on file.  This patient has no babies on file.  This patient has no babies on file.  This patient has no babies on  file.  This patient has no babies on file.  This patient has no babies on file.  This patient has no babies on file.  This patient has no babies on file.  This patient has no babies on file.  This patient has no babies on file.      This patient has no babies on file.  This patient has no babies on file.  This patient has no babies on file.  This patient has no babies on file.  This patient has no babies on file.  This patient has no babies on file.    This patient has no babies on file.  This patient has no babies on file.  This patient has no babies on file.        MATERNAL INFANT FEEDING:     Maternal Emotional State: relaxed (03/10/20 1500 : Bianca Keller RN)  Infant Positioning: cross-cradle (03/10/20 1500 : Bianca Keller RN)   Signs of Milk Transfer: infant jaw motion present, audible swallow, breasts soften with feeding (03/10/20 1500 : Bianca Keller RN)  Pain with Feeding: no (03/10/20 1500 : Bianca Keller RN)                       Latch Assistance: yes (03/10/20 1500 : Bianca Keller RN)                               EQUIPMENT TYPE:                                 BREAST PUMPING:          LACTATION REFERRALS:

## 2020-03-17 ENCOUNTER — TELEPHONE (OUTPATIENT)
Dept: LACTATION | Facility: HOSPITAL | Age: 25
End: 2020-03-17

## 2020-03-17 ENCOUNTER — APPOINTMENT (OUTPATIENT)
Dept: LACTATION | Facility: HOSPITAL | Age: 25
End: 2020-03-17

## 2020-04-07 ENCOUNTER — TELEPHONE (OUTPATIENT)
Dept: LACTATION | Facility: HOSPITAL | Age: 25
End: 2020-04-07

## 2020-04-07 NOTE — TELEPHONE ENCOUNTER
Mom reports she has a clogged duct for 2 days. She denies redness or fever. Encouraged mom to call OB if that occurs. Mom is using warm compresses and massage while baby BF to help. Encouraged to cont to use warm compress and massage for a few more days. Call LC or OB if not better in a few days

## 2021-02-23 ENCOUNTER — OFFICE VISIT (OUTPATIENT)
Dept: OBSTETRICS AND GYNECOLOGY | Age: 26
End: 2021-02-23

## 2021-02-23 DIAGNOSIS — Z01.419 WELL WOMAN EXAM WITH ROUTINE GYNECOLOGICAL EXAM: Primary | ICD-10-CM

## 2021-02-23 PROCEDURE — 99395 PREV VISIT EST AGE 18-39: CPT | Performed by: NURSE PRACTITIONER

## 2021-02-23 NOTE — PROGRESS NOTES
[facility]  Routine Annual Visit    2021    Patient: Anne Marie Patel          MR#:[id number]      History of Present Illness    25 y.o. female  who presents for annual exam without complaint    Doing well and baby girl, Celine doing well  Cycles normal she reports  Using condoms for contraception- declines hormonal birth control  Continues on prenatal vitamins    No LMP recorded.  Obstetric History:  OB History        1    Para   1    Term   1            AB        Living   1       SAB        TAB        Ectopic        Molar        Multiple   0    Live Births   1          Obstetric Comments   Placental path showed small placenta with decidual necrosis.            Menstrual History:     No LMP recorded.       Sexual History:       ________________________________________  Patient Active Problem List   Diagnosis   (none) - all problems resolved or deleted       Past Medical History:   Diagnosis Date   • Anxiety    • Broken arm     Right   • Dysmenorrhea        Past Surgical History:   Procedure Laterality Date   •  SECTION Bilateral 10/28/2019    Procedure:  SECTION PRIMARY;  Surgeon: Dez Durán MD;  Location: [facility] LABOR DELIVERY;  Service: Obstetrics/Gynecology   • WISDOM TOOTH EXTRACTION         Social History     Tobacco Use   Smoking Status Never Smoker   Smokeless Tobacco Never Used       Family History   Problem Relation Age of Onset   • Lung cancer Maternal Grandmother    • No Known Problems Mother    • No Known Problems Father    • No Known Problems Sister    • No Known Problems Brother    • No Known Problems Maternal Grandfather    • No Known Problems Paternal Grandmother    • Ankylosing spondylitis Paternal Grandfather    • Cancer Neg Hx        Prior to Admission medications    Medication Sig Start Date End Date Taking? Authorizing Provider   Omega-3 Fatty Acids (FISH OIL) 1000 MG capsule capsule Take  by mouth Daily With Breakfast.   Yes Provider,  "Evangelista, MD   Prenatal Vit w/Jw-Zrugcjwmf-TU (PNV PO) Take  by mouth.   Yes Provider, MD Evangelista   Loratadine (CLARITIN) 10 MG capsule Take  by mouth.    Provider, MD Evangelista   sertraline (ZOLOFT) 50 MG tablet Take 1 tablet by mouth Daily. 3/2/20   Phillip Palomo MD     ________________________________________    The following portions of the patient's history were reviewed and updated as appropriate: allergies, current medications, past family history, past medical history, past social history, past surgical history and problem list.    Review of Systems   Constitutional: Negative.    HENT: Negative.    Eyes: Negative for visual disturbance.   Respiratory: Negative for cough, shortness of breath and wheezing.    Cardiovascular: Negative for chest pain, palpitations and leg swelling.   Gastrointestinal: Negative for abdominal distention, abdominal pain, blood in stool, constipation, diarrhea, nausea and vomiting.   Endocrine: Negative for cold intolerance and heat intolerance.   Genitourinary: Negative for difficulty urinating, dyspareunia, dysuria, frequency, genital sores, hematuria, menstrual problem, pelvic pain, urgency, vaginal bleeding, vaginal discharge and vaginal pain.   Musculoskeletal: Negative.    Skin: Negative.    Neurological: Negative for dizziness, weakness, light-headedness, numbness and headaches.   Hematological: Negative.    Psychiatric/Behavioral: Negative.    Breasts: negative for lumps skin changes, dimpling, swelling, nipple changes/discharge bilaterally         Objective   Physical Exam    There were no vitals taken for this visit.   BP Readings from Last 3 Encounters:   12/09/19 108/74   11/08/19 100/58   11/01/19 123/80      Wt Readings from Last 3 Encounters:   12/09/19 50.3 kg (111 lb)   11/08/19 51.2 kg (112 lb 12.8 oz)   10/27/19 59 kg (130 lb)        BMI: Estimated body mass index is 19.05 kg/m² as calculated from the following:    Height as of 12/9/19: 162.6 cm (64\").    " Weight as of 12/9/19: 50.3 kg (111 lb).            General:   alert, appears stated age and cooperative   Heart: regular rate and rhythm, S1, S2 normal, no murmur, click, rub or gallop   Lungs: clear to auscultation bilaterally   Abdomen: soft, non-tender, without masses or organomegaly   Breast: inspection negative, no nipple discharge or bleeding, no masses or nodularity palpable   Vulva: External genitalia including bartholin's glands, Urethra, [place]'s gland and urethra meatus are normal, Perineum, rectum and anus appear normal  and Bladder appears normal without significant prolapse    Vagina: normal mucosa, normal discharge   Cervix: no cervical motion tenderness and no lesions   Uterus: normal size, mobile, non-tender or normal shape and consistency   Adnexa: normal adnexa     Assessment:    Diagnoses and all orders for this visit:    1. Well woman exam with routine gynecological exam (Primary)  -     IGP, Rfx Aptima HPV ASCU      Healthy lifestyle modifications discussed, counseled on self breast exams and bone health        Valeria Stallworth, CLOVIS  2/23/2021 15:15 EST

## 2021-02-25 ENCOUNTER — TELEPHONE (OUTPATIENT)
Dept: OBSTETRICS AND GYNECOLOGY | Age: 26
End: 2021-02-25

## 2021-02-25 NOTE — TELEPHONE ENCOUNTER
----- Message from CLOVIS Gardner sent at 2/25/2021  9:45 AM EST -----  Please inform patient her pap smear returned negative (normal). Thank you.

## 2021-02-25 NOTE — TELEPHONE ENCOUNTER
Patient returned call and was informed of normal pap smear results.  Patient voices understanding.   ASSESSMENT:     64y POD#1, s/p ex-lap, CHRISS, PLND, elevation of abdominal flap with plastic surgery POD 1, doing well:     PLAN:   - Management per Gynonc, appreciate care   - Continue abd binder  - ANTHONY drain care   - PCA per gyn   - Nix per gyn  - DVT ppx Lovenox     Plastic Surgery   [id number]

## 2021-04-16 ENCOUNTER — BULK ORDERING (OUTPATIENT)
Dept: CASE MANAGEMENT | Facility: OTHER | Age: 26
End: 2021-04-16

## 2021-04-16 DIAGNOSIS — Z23 IMMUNIZATION DUE: ICD-10-CM

## 2021-09-13 ENCOUNTER — TELEPHONE (OUTPATIENT)
Dept: OBSTETRICS AND GYNECOLOGY | Age: 26
End: 2021-09-13

## 2021-09-13 NOTE — TELEPHONE ENCOUNTER
----- Message from Phillip Palomo MD sent at 9/13/2021  2:03 PM EDT -----  Regarding: FW: Non-Urgent Medical Question  Contact: [phone]      ----- Message -----  From: Yojana Christy MA  Sent: 9/13/2021  10:39 AM EDT  To: Phillip Palomo MD  Subject: FW: Non-Urgent Medical Question                    ----- Message -----  From: Anne Marie Patel  Sent: 9/13/2021   8:09 AM EDT  [facility]  Subject: Non-Urgent Medical Question                      Good morning, I am a patient of Dr. Palomo and have a question. Last night I had some bilateral nipple discharge that was similar to lactation. I stopped breastfeeding about 10 months ago. Is this something I should be seen for, or should I monitor and see if it happens again? Thank you so much.

## 2021-09-17 ENCOUNTER — OFFICE VISIT (OUTPATIENT)
Dept: OBSTETRICS AND GYNECOLOGY | Age: 26
End: 2021-09-17

## 2021-09-17 VITALS
DIASTOLIC BLOOD PRESSURE: 62 MMHG | SYSTOLIC BLOOD PRESSURE: 112 MMHG | WEIGHT: 107 LBS | BODY MASS INDEX: 18.27 KG/M2 | HEIGHT: 64 IN

## 2021-09-17 DIAGNOSIS — N64.52 BREAST DISCHARGE: Primary | ICD-10-CM

## 2021-09-17 PROCEDURE — 99213 OFFICE O/P EST LOW 20 MIN: CPT | Performed by: NURSE PRACTITIONER

## 2021-09-17 RX ORDER — CETIRIZINE HYDROCHLORIDE 10 MG/1
10 TABLET ORAL DAILY
COMMUNITY

## 2021-09-17 NOTE — PROGRESS NOTES
"Subjective   Anne Marie Patel is a 26 y.o. female is being seen today for   Chief Complaint   Patient presents with   • Breast Problem     c/o bilateral nipple discharge started sunday night   .    History of Present Illness     Patient of Dr Palomo  She called with some bilateral breast discharge  It looks similar to breast milk  She stopped breastfeeding about 10 months ago  No lumps or skin changes  No breast pain  No other health changes or concerns  She noticed it during IC but has also been able to elicit it since that time.  She was also ovulating at that time and they are also trying for pregnancy    The following portions of the patient's history were reviewed and updated as appropriate: allergies, current medications, past family history, past medical history, past social history, past surgical history and problem list.    /62   Ht 162.6 cm (64\")   Wt 48.5 kg (107 lb)   LMP 08/31/2021 (Exact Date)   Breastfeeding No   BMI 18.37 kg/m²       Review of Systems   Constitutional: Negative.    HENT: Negative.    Eyes: Negative.    Respiratory: Negative.         Breast discharge   Cardiovascular: Negative.    Gastrointestinal: Negative.    Endocrine: Negative.    Genitourinary: Negative.    Musculoskeletal: Negative.    Skin: Negative.    Allergic/Immunologic: Negative.    Neurological: Negative.    Hematological: Negative.    Psychiatric/Behavioral: Negative.        Objective   Physical Exam  Constitutional:       Appearance: She is well-developed.   Pulmonary:      Effort: Pulmonary effort is normal.   Chest:      Breasts:         Right: Nipple discharge present. No inverted nipple, mass, skin change or tenderness.         Left: Nipple discharge present. No inverted nipple, mass, skin change or tenderness.      Comments: Bilateral white, milky discharge noted  Small amount, only when squeezed  Abdominal:      Palpations: Abdomen is soft.   Skin:     General: Skin is warm and dry.   Neurological:      " Mental Status: She is alert and oriented to person, place, and time.   Psychiatric:         Behavior: Behavior normal.           Assessment/Plan   Diagnoses and all orders for this visit:    1. Breast discharge (Primary)  -     Prolactin  -     Thyroid Panel With TSH  -     US Breast Bilateral Complete      Check labs and breast ultrasound, offered surgeon consult but prefers to wait  Plan follow up in 4 weeks with Dr Palomo           Total time spent today with Anne Marie  was 20-29 minutes (level 3).  Greater than 50% of the time was spent coordinating care, answering her questions and counseling regarding pathophysiology of her presenting problem along with plans for any diagnositc work-up and treatment.

## 2021-09-18 LAB
FT4I SERPL CALC-MCNC: 1.6 (ref 1.2–4.9)
PROLACTIN SERPL-MCNC: 16.7 NG/ML (ref 4.8–23.3)
T3RU NFR SERPL: 26 % (ref 24–39)
T4 SERPL-MCNC: 6 UG/DL (ref 4.5–12)
TSH SERPL DL<=0.005 MIU/L-ACNC: 3.23 UIU/ML (ref 0.45–4.5)

## 2021-09-27 ENCOUNTER — HOSPITAL ENCOUNTER (OUTPATIENT)
Dept: ULTRASOUND IMAGING | Facility: HOSPITAL | Age: 26
Discharge: HOME | End: 2021-09-27
Admitting: NURSE PRACTITIONER

## 2021-09-27 PROCEDURE — 76642 ULTRASOUND BREAST LIMITED: CPT

## 2021-10-15 ENCOUNTER — TELEPHONE (OUTPATIENT)
Dept: OBSTETRICS AND GYNECOLOGY | Age: 26
End: 2021-10-15

## 2021-10-15 ENCOUNTER — INITIAL PRENATAL (OUTPATIENT)
Dept: OBSTETRICS AND GYNECOLOGY | Age: 26
End: 2021-10-15

## 2021-10-15 VITALS — WEIGHT: 108 LBS | BODY MASS INDEX: 18.54 KG/M2 | DIASTOLIC BLOOD PRESSURE: 64 MMHG | SYSTOLIC BLOOD PRESSURE: 108 MMHG

## 2021-10-15 DIAGNOSIS — Z13.89 SCREENING FOR BLOOD OR PROTEIN IN URINE: Primary | ICD-10-CM

## 2021-10-15 DIAGNOSIS — Z11.3 SCREENING EXAMINATION FOR VENEREAL DISEASE: ICD-10-CM

## 2021-10-15 DIAGNOSIS — Z87.59 HISTORY OF PRIOR PREGNANCY WITH IUGR NEWBORN: ICD-10-CM

## 2021-10-15 DIAGNOSIS — R92.8 ABNORMAL MAMMOGRAM: ICD-10-CM

## 2021-10-15 DIAGNOSIS — Z98.891 PREVIOUS CESAREAN SECTION: ICD-10-CM

## 2021-10-15 LAB
GLUCOSE UR STRIP-MCNC: NEGATIVE MG/DL
PROT UR STRIP-MCNC: NEGATIVE MG/DL
VZV IGG SER QL: NORMAL

## 2021-10-15 PROCEDURE — 0501F PRENATAL FLOW SHEET: CPT | Performed by: OBSTETRICS & GYNECOLOGY

## 2021-10-15 RX ORDER — DOXYLAMINE SUCCINATE AND PYRIDOXINE HYDROCHLORIDE, DELAYED RELEASE TABLETS 10 MG/10 MG 10; 10 MG/1; MG/1
TABLET, DELAYED RELEASE ORAL
Qty: 100 TABLET | Refills: 0 | Status: SHIPPED | OUTPATIENT
Start: 2021-10-15 | End: 2021-11-15

## 2021-10-15 RX ORDER — ASPIRIN 81 MG/1
81 TABLET ORAL DAILY
Qty: 30 TABLET | Refills: 7 | Status: SHIPPED | OUTPATIENT
Start: 2021-10-15 | End: 2022-04-21

## 2021-10-15 RX ORDER — DOXYLAMINE SUCCINATE AND PYRIDOXINE HYDROCHLORIDE, DELAYED RELEASE TABLETS 10 MG/10 MG 10; 10 MG/1; MG/1
2 TABLET, DELAYED RELEASE ORAL NIGHTLY PRN
Qty: 60 TABLET | Refills: 1 | Status: SHIPPED | OUTPATIENT
Start: 2021-10-15 | End: 2021-11-15

## 2021-10-15 NOTE — PROGRESS NOTES
The patient is a 26-year-old  2 para 1-0-0-1 at 6 weeks 3 days by sure LMP consistent with ultrasound today. Patient has a significant history of IUGR with her last pregnancy. She was induced at 37 weeks but had fetal heart rate decelerations and  section was done. Placental pathology showed some decidual necrosis. She did not have any hypertension. Patient is having some nausea. She is taking prenatal vitamins. She is thinking that she will probably have a repeat . She has had her Covid and flu vaccination. Patient works as a physical therapist 3 times a week. She previously took Zoloft but has gone off of it.    Full history is reviewed    ultrasound shows viable harrison intrauterine pregnancy    exam-see exam tab    assessment-6 weeks  history of IUGR-we discussed plan for frequent monitoring with ultrasounds every 4 weeks after 20 weeks. If patient shows IUGR we would initiate Dopplers. Recommend starting low-dose aspirin at 12 weeks of pregnancy  prior  section-we discussed risk and benefits of  and  section. Patient is leaning towards a repeat  section.  Patient has had both of her vaccinations  genetic testing-patient declines amniocentesis but would like to do cell free DNA testing and carrier testing.

## 2021-10-16 LAB
ABO GROUP BLD: NORMAL
BASOPHILS # BLD AUTO: 0.1 X10E3/UL (ref 0–0.2)
BASOPHILS NFR BLD AUTO: 1 %
BLD GP AB SCN SERPL QL: NEGATIVE
EOSINOPHIL # BLD AUTO: 0.1 X10E3/UL (ref 0–0.4)
EOSINOPHIL NFR BLD AUTO: 1 %
ERYTHROCYTE [DISTWIDTH] IN BLOOD BY AUTOMATED COUNT: 12.6 % (ref 11.7–15.4)
HBV SURFACE AG SERPL QL IA: NEGATIVE
HCT VFR BLD AUTO: 41.1 % (ref 34–46.6)
HCV AB S/CO SERPL IA: 0.2 S/CO RATIO (ref 0–0.9)
HGB BLD-MCNC: 13.8 G/DL (ref 11.1–15.9)
HIV 1+2 AB+HIV1 P24 AG SERPL QL IA: NON REACTIVE
IMM GRANULOCYTES # BLD AUTO: 0 X10E3/UL (ref 0–0.1)
IMM GRANULOCYTES NFR BLD AUTO: 0 %
LYMPHOCYTES # BLD AUTO: 1.9 X10E3/UL (ref 0.7–3.1)
LYMPHOCYTES NFR BLD AUTO: 28 %
MCH RBC QN AUTO: 30.1 PG (ref 26.6–33)
MCHC RBC AUTO-ENTMCNC: 33.6 G/DL (ref 31.5–35.7)
MCV RBC AUTO: 90 FL (ref 79–97)
MONOCYTES # BLD AUTO: 0.5 X10E3/UL (ref 0.1–0.9)
MONOCYTES NFR BLD AUTO: 7 %
NEUTROPHILS # BLD AUTO: 4.1 X10E3/UL (ref 1.4–7)
NEUTROPHILS NFR BLD AUTO: 63 %
PLATELET # BLD AUTO: 227 X10E3/UL (ref 150–450)
RBC # BLD AUTO: 4.58 X10E6/UL (ref 3.77–5.28)
RH BLD: POSITIVE
RPR SER QL: NON REACTIVE
RUBV IGG SERPL IA-ACNC: 4.53 INDEX
WBC # BLD AUTO: 6.6 X10E3/UL (ref 3.4–10.8)

## 2021-10-17 LAB
BACTERIA UR CULT: NORMAL
BACTERIA UR CULT: NORMAL

## 2021-10-19 LAB
C TRACH RRNA SPEC QL NAA+PROBE: NEGATIVE
N GONORRHOEA RRNA SPEC QL NAA+PROBE: NEGATIVE

## 2021-11-15 ENCOUNTER — ROUTINE PRENATAL (OUTPATIENT)
Dept: OBSTETRICS AND GYNECOLOGY | Age: 26
End: 2021-11-15

## 2021-11-15 VITALS — DIASTOLIC BLOOD PRESSURE: 66 MMHG | WEIGHT: 109 LBS | SYSTOLIC BLOOD PRESSURE: 108 MMHG | BODY MASS INDEX: 18.71 KG/M2

## 2021-11-15 DIAGNOSIS — Z98.891 PREVIOUS CESAREAN SECTION: ICD-10-CM

## 2021-11-15 DIAGNOSIS — Z13.89 SCREENING FOR BLOOD OR PROTEIN IN URINE: Primary | ICD-10-CM

## 2021-11-15 LAB
GLUCOSE UR STRIP-MCNC: NEGATIVE MG/DL
PROT UR STRIP-MCNC: NEGATIVE MG/DL

## 2021-11-15 PROCEDURE — 0502F SUBSEQUENT PRENATAL CARE: CPT | Performed by: OBSTETRICS & GYNECOLOGY

## 2021-11-15 RX ORDER — DOXYLAMINE SUCCINATE AND PYRIDOXINE HYDROCHLORIDE 20; 20 MG/1; MG/1
TABLET, EXTENDED RELEASE ORAL
COMMUNITY
End: 2022-01-03

## 2021-11-15 NOTE — PROGRESS NOTES
The patient is still having some nausea.  She is using the Unisom and B6 and taking the Bonjesta on and off.    New OB labs are reviewed and are normal.  Doppler heart tones are positive  1 pound weight gain since last visit.  3 pound total weight gain for pregnancy.    Assessment-10 weeks  Patient will do cell free DNA and carrier testing today  Patient has had her vaccinations  Follow-up in 4 weeks  Continue medication for nausea.

## 2021-12-14 ENCOUNTER — ROUTINE PRENATAL (OUTPATIENT)
Dept: OBSTETRICS AND GYNECOLOGY | Age: 26
End: 2021-12-14

## 2021-12-14 VITALS — SYSTOLIC BLOOD PRESSURE: 108 MMHG | WEIGHT: 110 LBS | DIASTOLIC BLOOD PRESSURE: 74 MMHG | BODY MASS INDEX: 18.88 KG/M2

## 2021-12-14 DIAGNOSIS — F41.9 ANXIETY: ICD-10-CM

## 2021-12-14 DIAGNOSIS — Z98.891 PREVIOUS CESAREAN SECTION: ICD-10-CM

## 2021-12-14 DIAGNOSIS — Z87.59 HISTORY OF PRIOR PREGNANCY WITH IUGR NEWBORN: ICD-10-CM

## 2021-12-14 DIAGNOSIS — Z13.89 SCREENING FOR BLOOD OR PROTEIN IN URINE: Primary | ICD-10-CM

## 2021-12-14 LAB
GLUCOSE UR STRIP-MCNC: NEGATIVE MG/DL
PROT UR STRIP-MCNC: NEGATIVE MG/DL

## 2021-12-14 PROCEDURE — 0502F SUBSEQUENT PRENATAL CARE: CPT | Performed by: OBSTETRICS & GYNECOLOGY

## 2021-12-14 NOTE — PROGRESS NOTES
Patient feels like the nausea is getting a bit better.  She is still taking Unisom and B6.    Cell free DNA and carrier testing were normal.  Baby is a boy  Weight gain is normal  Doppler heart tones are normal    Assessment-15 weeks  History of IUGR-plan to follow weights throughout the pregnancy-continue low-dose aspirin  AFP test today  Follow-up in 3 weeks.  Anatomy ultrasound at 20 weeks.

## 2021-12-16 LAB
AFP ADJ MOM SERPL: 1.42
AFP INTERP SERPL-IMP: NORMAL
AFP INTERP SERPL-IMP: NORMAL
AFP SERPL-MCNC: 52.1 NG/ML
AGE AT DELIVERY: 29.1 YR
GA METHOD: NORMAL
GA: 15 WEEKS
IDDM PATIENT QL: NO
LABORATORY COMMENT REPORT: NORMAL
MULTIPLE PREGNANCY: NO
NEURAL TUBE DEFECT RISK FETUS: 3404 %
RESULT: NORMAL

## 2022-01-03 ENCOUNTER — ROUTINE PRENATAL (OUTPATIENT)
Dept: OBSTETRICS AND GYNECOLOGY | Age: 27
End: 2022-01-03

## 2022-01-03 VITALS — BODY MASS INDEX: 19.74 KG/M2 | SYSTOLIC BLOOD PRESSURE: 108 MMHG | DIASTOLIC BLOOD PRESSURE: 66 MMHG | WEIGHT: 115 LBS

## 2022-01-03 DIAGNOSIS — Z87.59 HISTORY OF PRIOR PREGNANCY WITH IUGR NEWBORN: ICD-10-CM

## 2022-01-03 DIAGNOSIS — Z98.891 PREVIOUS CESAREAN SECTION: ICD-10-CM

## 2022-01-03 DIAGNOSIS — Z13.89 SCREENING FOR BLOOD OR PROTEIN IN URINE: Primary | ICD-10-CM

## 2022-01-03 LAB
GLUCOSE UR STRIP-MCNC: NEGATIVE MG/DL
PROT UR STRIP-MCNC: NEGATIVE MG/DL

## 2022-01-03 PROCEDURE — 0502F SUBSEQUENT PRENATAL CARE: CPT | Performed by: OBSTETRICS & GYNECOLOGY

## 2022-01-03 NOTE — PROGRESS NOTES
Patient is feeling well.  She feels some fetal movement.    Weight gain for pregnancy is normal  Blood pressure 108/66 with no protein  Doppler heart tones are positive.  AFP was normal.    Assessment-17 weeks 6 days  History of IUGR-anatomy ultrasound in 3 weeks.  Continue low-dose aspirin  Prior  section

## 2022-01-25 ENCOUNTER — TELEPHONE (OUTPATIENT)
Dept: OBSTETRICS AND GYNECOLOGY | Age: 27
End: 2022-01-25

## 2022-01-25 NOTE — TELEPHONE ENCOUNTER
Pt called back stating that she has a home covid test that she can retest before her appt tomorrow if needed.

## 2022-01-25 NOTE — TELEPHONE ENCOUNTER
Pt called and c/o cough and sore throat. She states she had a negative PCR test over the weekend and wanted to know if she can still come to her appt 2/26. I spoke with kwesi and decided to leave it up to the provider. Please advise if she can come to her appt

## 2022-01-25 NOTE — TELEPHONE ENCOUNTER
Pt states that she has not had any known exposure to Covid. She tested negative on 1-22 for Covid, strep and Flu. Pt has not had a fever. Can she c/i for U/S and visit 1-26 or does she need to reschedule?

## 2022-01-26 ENCOUNTER — ROUTINE PRENATAL (OUTPATIENT)
Dept: OBSTETRICS AND GYNECOLOGY | Age: 27
End: 2022-01-26

## 2022-01-26 VITALS — WEIGHT: 119 LBS | DIASTOLIC BLOOD PRESSURE: 74 MMHG | SYSTOLIC BLOOD PRESSURE: 110 MMHG | BODY MASS INDEX: 20.43 KG/M2

## 2022-01-26 DIAGNOSIS — Z87.59 HISTORY OF PRIOR PREGNANCY WITH IUGR NEWBORN: ICD-10-CM

## 2022-01-26 DIAGNOSIS — Z98.891 PREVIOUS CESAREAN SECTION: ICD-10-CM

## 2022-01-26 DIAGNOSIS — Z13.89 SCREENING FOR BLOOD OR PROTEIN IN URINE: Primary | ICD-10-CM

## 2022-01-26 LAB
GLUCOSE UR STRIP-MCNC: NEGATIVE MG/DL
PROT UR STRIP-MCNC: NEGATIVE MG/DL

## 2022-01-26 PROCEDURE — 0502F SUBSEQUENT PRENATAL CARE: CPT | Performed by: OBSTETRICS & GYNECOLOGY

## 2022-01-26 NOTE — PROGRESS NOTES
Patient had U/S and then left the office due to Dr Palomo being at the hospital. She will wait for Dr Palomo to call her once she has reviewed the U/S images.

## 2022-01-26 NOTE — PROGRESS NOTES
I had to leave the office due to an emergency at the hospital.  I reviewed the patient's anatomy ultrasound. Anatomy appears normal. Cervical length is normal. Growth is appropriate. Placenta is posterior with no previa  Patient's blood pressure was normal and weight gain are normal.  I called the patient by phone and reviewed the findings.  She will follow-up at 24 weeks. She has a history of IUGR. We will recheck weight at 28 weeks and she will continue her low-dose aspirin

## 2022-02-17 ENCOUNTER — ROUTINE PRENATAL (OUTPATIENT)
Dept: OBSTETRICS AND GYNECOLOGY | Age: 27
End: 2022-02-17

## 2022-02-17 VITALS — DIASTOLIC BLOOD PRESSURE: 78 MMHG | SYSTOLIC BLOOD PRESSURE: 108 MMHG | WEIGHT: 124 LBS | BODY MASS INDEX: 21.28 KG/M2

## 2022-02-17 DIAGNOSIS — Z98.891 PREVIOUS CESAREAN SECTION: ICD-10-CM

## 2022-02-17 DIAGNOSIS — Z87.59 HISTORY OF PRIOR PREGNANCY WITH IUGR NEWBORN: ICD-10-CM

## 2022-02-17 DIAGNOSIS — Z13.89 SCREENING FOR BLOOD OR PROTEIN IN URINE: Primary | ICD-10-CM

## 2022-02-17 LAB
GLUCOSE UR STRIP-MCNC: NEGATIVE MG/DL
PROT UR STRIP-MCNC: NEGATIVE MG/DL

## 2022-02-17 PROCEDURE — 0502F SUBSEQUENT PRENATAL CARE: CPT | Performed by: OBSTETRICS & GYNECOLOGY

## 2022-02-17 RX ORDER — LIDOCAINE HYDROCHLORIDE 10 MG/ML
5 INJECTION, SOLUTION EPIDURAL; INFILTRATION; INTRACAUDAL; PERINEURAL AS NEEDED
Status: CANCELLED | OUTPATIENT
Start: 2022-02-17

## 2022-02-17 RX ORDER — CARBOPROST TROMETHAMINE 250 UG/ML
250 INJECTION, SOLUTION INTRAMUSCULAR AS NEEDED
Status: CANCELLED | OUTPATIENT
Start: 2022-02-17

## 2022-02-17 RX ORDER — SODIUM CHLORIDE 0.9 % (FLUSH) 0.9 %
10 SYRINGE (ML) INJECTION EVERY 12 HOURS SCHEDULED
Status: CANCELLED | OUTPATIENT
Start: 2022-02-17

## 2022-02-17 RX ORDER — FAMOTIDINE 10 MG
10 TABLET ORAL 2 TIMES DAILY
COMMUNITY
End: 2022-03-31 | Stop reason: ALTCHOICE

## 2022-02-17 RX ORDER — MISOPROSTOL 100 UG/1
800 TABLET ORAL AS NEEDED
Status: CANCELLED | OUTPATIENT
Start: 2022-02-17

## 2022-02-17 RX ORDER — SODIUM CHLORIDE 0.9 % (FLUSH) 0.9 %
1-10 SYRINGE (ML) INJECTION AS NEEDED
Status: CANCELLED | OUTPATIENT
Start: 2022-02-17

## 2022-02-17 RX ORDER — METHYLERGONOVINE MALEATE 0.2 MG/ML
200 INJECTION INTRAVENOUS AS NEEDED
Status: CANCELLED | OUTPATIENT
Start: 2022-02-17

## 2022-02-17 NOTE — PROGRESS NOTES
The patient is feeling much better.  She is eating normally.  Baby is moving well.  No complaints.    Doppler heart tones are positive and fundal height is appropriate  Weight gain for pregnancy is normal  Blood pressure 108/78 with no protein.    Assessment-24 weeks  History of IUGR-recheck fetal weight at 28 weeks.  Continue low-dose aspirin.  Continue to follow growth every 4 weeks.  Prior  section-discussed planning for  at 39 weeks.  If IUGR is found we will need to adjust timing of delivery.  Discussed starting kick counts at 28 weeks.

## 2022-03-02 RX ORDER — ONDANSETRON 4 MG/1
4 TABLET, FILM COATED ORAL DAILY PRN
Qty: 30 TABLET | Refills: 1 | Status: SHIPPED | OUTPATIENT
Start: 2022-03-02 | End: 2022-03-17

## 2022-03-17 ENCOUNTER — ROUTINE PRENATAL (OUTPATIENT)
Dept: OBSTETRICS AND GYNECOLOGY | Age: 27
End: 2022-03-17

## 2022-03-17 VITALS — WEIGHT: 126 LBS | DIASTOLIC BLOOD PRESSURE: 70 MMHG | SYSTOLIC BLOOD PRESSURE: 112 MMHG | BODY MASS INDEX: 21.63 KG/M2

## 2022-03-17 DIAGNOSIS — Z13.1 SCREENING FOR DIABETES MELLITUS: ICD-10-CM

## 2022-03-17 DIAGNOSIS — Z13.89 SCREENING FOR BLOOD OR PROTEIN IN URINE: Primary | ICD-10-CM

## 2022-03-17 DIAGNOSIS — Z13.0 SCREENING FOR IRON DEFICIENCY ANEMIA: ICD-10-CM

## 2022-03-17 DIAGNOSIS — Z87.59 HISTORY OF PRIOR PREGNANCY WITH IUGR NEWBORN: ICD-10-CM

## 2022-03-17 DIAGNOSIS — Z98.891 PREVIOUS CESAREAN SECTION: ICD-10-CM

## 2022-03-17 LAB
GLUCOSE UR STRIP-MCNC: NEGATIVE MG/DL
PROT UR STRIP-MCNC: NEGATIVE MG/DL

## 2022-03-17 PROCEDURE — 0502F SUBSEQUENT PRENATAL CARE: CPT | Performed by: OBSTETRICS & GYNECOLOGY

## 2022-03-17 PROCEDURE — 90715 TDAP VACCINE 7 YRS/> IM: CPT | Performed by: OBSTETRICS & GYNECOLOGY

## 2022-03-17 PROCEDURE — 90471 IMMUNIZATION ADMIN: CPT | Performed by: OBSTETRICS & GYNECOLOGY

## 2022-03-17 NOTE — PROGRESS NOTES
Patient was going to schedule her breast ultrasound.  This was a follow-up of prior lesion that was thought to be benign.  She is still taking Pepcid she is taking her baby aspirin.    Ultrasound shows estimated fetal weight of 3 pounds 1 ounce at the 67th percentile.  Abdominal circumference is at the 91st percentile.  WENDY is normal at 21 and baby is vertex  Weight gain for pregnancy is normal at 20 pounds  Blood pressure 112/70 with no protein.    Assessment-28 weeks  Third trimester labs today.  Blood type is a positive.  Tdap today  History of IUGR-normal weight today at the 67th percentile.  Continue low-dose aspirin.  Follow growth every 4 weeks.  Prior  section.  Patient is scheduled for May 31.  We discussed kick counts.

## 2022-03-18 LAB
ERYTHROCYTE [DISTWIDTH] IN BLOOD BY AUTOMATED COUNT: 12.4 % (ref 11.7–15.4)
GLUCOSE 1H P 50 G GLC PO SERPL-MCNC: 78 MG/DL (ref 65–139)
HCT VFR BLD AUTO: 35.7 % (ref 34–46.6)
HGB BLD-MCNC: 12.4 G/DL (ref 11.1–15.9)
MCH RBC QN AUTO: 33 PG (ref 26.6–33)
MCHC RBC AUTO-ENTMCNC: 34.7 G/DL (ref 31.5–35.7)
MCV RBC AUTO: 95 FL (ref 79–97)
PLATELET # BLD AUTO: 244 X10E3/UL (ref 150–450)
RBC # BLD AUTO: 3.76 X10E6/UL (ref 3.77–5.28)
WBC # BLD AUTO: 11.1 X10E3/UL (ref 3.4–10.8)

## 2022-03-28 ENCOUNTER — APPOINTMENT (OUTPATIENT)
Dept: ULTRASOUND IMAGING | Facility: HOSPITAL | Age: 27
End: 2022-03-28

## 2022-03-31 ENCOUNTER — ROUTINE PRENATAL (OUTPATIENT)
Dept: OBSTETRICS AND GYNECOLOGY | Age: 27
End: 2022-03-31

## 2022-03-31 VITALS — WEIGHT: 126 LBS | DIASTOLIC BLOOD PRESSURE: 72 MMHG | SYSTOLIC BLOOD PRESSURE: 108 MMHG | BODY MASS INDEX: 21.63 KG/M2

## 2022-03-31 DIAGNOSIS — K21.9 GASTROESOPHAGEAL REFLUX DISEASE WITHOUT ESOPHAGITIS: ICD-10-CM

## 2022-03-31 DIAGNOSIS — Z98.891 PREVIOUS CESAREAN SECTION: ICD-10-CM

## 2022-03-31 DIAGNOSIS — Z13.89 SCREENING FOR BLOOD OR PROTEIN IN URINE: Primary | ICD-10-CM

## 2022-03-31 DIAGNOSIS — Z87.59 HISTORY OF PRIOR PREGNANCY WITH IUGR NEWBORN: ICD-10-CM

## 2022-03-31 LAB
GLUCOSE UR STRIP-MCNC: NEGATIVE MG/DL
PROT UR STRIP-MCNC: NEGATIVE MG/DL

## 2022-03-31 PROCEDURE — 0502F SUBSEQUENT PRENATAL CARE: CPT | Performed by: OBSTETRICS & GYNECOLOGY

## 2022-03-31 RX ORDER — OMEPRAZOLE 40 MG/1
40 CAPSULE, DELAYED RELEASE ORAL DAILY
Qty: 30 CAPSULE | Refills: 2 | Status: SHIPPED | OUTPATIENT
Start: 2022-03-31 | End: 2022-06-13

## 2022-03-31 NOTE — PROGRESS NOTES
The patient notes the Pepcid is not working as well as it used to.  She is having reflux.  Baby is moving well.  She is eating frequent meals and snacks.      Doppler heart tones are positive and fundal height is appropriate  Blood pressure 108/72 no protein  Weight gain for pregnancy is normal.    Assessment-30 weeks  History of IUGR-normal weight at 30-week ultrasound at the 67th percentile and abdominal circumference at the 91st percentile.  Repeat weight in 2 weeks.  Reflux-changed to Prilosec  Third trimester labs were normal  Recommend kick counts  Repeat  is scheduled for May 31.

## 2022-04-07 ENCOUNTER — APPOINTMENT (OUTPATIENT)
Dept: ULTRASOUND IMAGING | Facility: HOSPITAL | Age: 27
End: 2022-04-07

## 2022-04-14 ENCOUNTER — ROUTINE PRENATAL (OUTPATIENT)
Dept: OBSTETRICS AND GYNECOLOGY | Age: 27
End: 2022-04-14

## 2022-04-14 ENCOUNTER — TELEPHONE (OUTPATIENT)
Dept: OBSTETRICS AND GYNECOLOGY | Age: 27
End: 2022-04-14

## 2022-04-14 VITALS — BODY MASS INDEX: 21.97 KG/M2 | WEIGHT: 128 LBS | SYSTOLIC BLOOD PRESSURE: 108 MMHG | DIASTOLIC BLOOD PRESSURE: 64 MMHG

## 2022-04-14 DIAGNOSIS — Z87.59 HISTORY OF PRIOR PREGNANCY WITH IUGR NEWBORN: ICD-10-CM

## 2022-04-14 DIAGNOSIS — Z13.89 SCREENING FOR BLOOD OR PROTEIN IN URINE: Primary | ICD-10-CM

## 2022-04-14 DIAGNOSIS — O46.90 VAGINAL BLEEDING IN PREGNANCY: ICD-10-CM

## 2022-04-14 DIAGNOSIS — Z98.891 PREVIOUS CESAREAN SECTION: ICD-10-CM

## 2022-04-14 DIAGNOSIS — K21.9 GASTROESOPHAGEAL REFLUX DISEASE WITHOUT ESOPHAGITIS: ICD-10-CM

## 2022-04-14 LAB
GLUCOSE UR STRIP-MCNC: NEGATIVE MG/DL
PROT UR STRIP-MCNC: NEGATIVE MG/DL

## 2022-04-14 PROCEDURE — 0502F SUBSEQUENT PRENATAL CARE: CPT | Performed by: OBSTETRICS & GYNECOLOGY

## 2022-04-14 NOTE — PROGRESS NOTES
Patient reports that she saw some brown spotting this morning after going running.  She also had a bowel movement.  She has not had sex.  No bright red vaginal bleeding.  No pain or contractions.  Prilosec is working well and she is able to eat normally.    Ultrasound showsfetal weight of 4 pounds 11 ounces at the 65th percentile.  Abdominal circumference at the 81st percentile.  Baby is vertex.  Cervical length is normal at 4.8 cm with no funneling.  There is no placenta previa.  Blood pressure 108/64 with no protein  Fundal height is appropriate  Weight gain for pregnancy is normal.    Assessment-32 weeks  Spotting in pregnancy.  Cervical length is normal and patient is not having any pain.  Patient will start pelvic rest and stop running until she sees me next week.  She will call with any bright red bleeding.  Warnings given.  History of IUGR-normal weight today at the 65th percentile.  Reflux-Prilosec is working well with normal weight gain  Repeat  scheduled for May 31.

## 2022-04-14 NOTE — TELEPHONE ENCOUNTER
----- Message from Phillip Palomo MD sent at 4/14/2022 10:05 AM EDT -----  Regarding: FW: Travel      ----- Message -----  From: Flower Holman MA  Sent: 4/14/2022   9:25 AM EDT  To: Phillip Palomo MD  Subject: FW: Travel                                         ----- Message -----  From: Anne Marie Patel  Sent: 4/14/2022   9:22 AM EDT  To: [facility]  Subject: Travel                                           Dr. Palomo,  So sorry -  I meant to ask this at my appointment this morning and forgot- is there any reason that you think we should not go out of town this weekend? Just wanted to make sure with the new spotting that it would still be ok. Thank you! Nicole

## 2022-04-15 ENCOUNTER — TELEPHONE (OUTPATIENT)
Dept: OBSTETRICS AND GYNECOLOGY | Age: 27
End: 2022-04-15

## 2022-04-15 ENCOUNTER — ROUTINE PRENATAL (OUTPATIENT)
Dept: OBSTETRICS AND GYNECOLOGY | Age: 27
End: 2022-04-15

## 2022-04-15 VITALS — BODY MASS INDEX: 22.38 KG/M2 | SYSTOLIC BLOOD PRESSURE: 102 MMHG | WEIGHT: 130.4 LBS | DIASTOLIC BLOOD PRESSURE: 52 MMHG

## 2022-04-15 DIAGNOSIS — O46.90 VAGINAL BLEEDING IN PREGNANCY: ICD-10-CM

## 2022-04-15 DIAGNOSIS — Z13.89 SCREENING FOR BLOOD OR PROTEIN IN URINE: Primary | ICD-10-CM

## 2022-04-15 LAB
BILIRUB BLD-MCNC: NEGATIVE MG/DL
GLUCOSE UR STRIP-MCNC: NEGATIVE MG/DL
KETONES UR QL: NEGATIVE
LEUKOCYTE EST, POC: NEGATIVE
NITRITE UR-MCNC: NEGATIVE MG/ML
PH UR: 7 [PH] (ref 5–8)
PROT UR STRIP-MCNC: NEGATIVE MG/DL
RBC # UR STRIP: ABNORMAL /UL
SP GR UR: 1.02 (ref 1–1.03)
UROBILINOGEN UR QL: NORMAL

## 2022-04-15 PROCEDURE — 76819 FETAL BIOPHYS PROFIL W/O NST: CPT | Performed by: OBSTETRICS & GYNECOLOGY

## 2022-04-15 PROCEDURE — 0502F SUBSEQUENT PRENATAL CARE: CPT | Performed by: NURSE PRACTITIONER

## 2022-04-15 NOTE — PROGRESS NOTES
Pt seen yesterday for 32 w check. Had brown spotting after exercise. BPP was WNL. No bowel/bladder problems. No recent intercourse. She is still having the spotting today, brown. + BH cx on and off.     U/s reviewed today, Dr. Palomo notified.  WENDY: 16.93 cm  FHR: 138  BPP: 8/8    Noted posterior placenta with anterior succenturiate lobe.     Reassured pt that findings were normal. Continue pelvic rest and no running as counseled by Dr. Palomo. PTL warnings given.

## 2022-04-15 NOTE — TELEPHONE ENCOUNTER
----- Message from Anne Marie Patel sent at 4/15/2022  7:03 AM EDT -----  Regarding: Travel  Dr. Palomo,   This morning I had a small amount of spotting again. Very similar to yesterday, brown and not very much. It was after a light walk. No abdominal pain and baby is still moving well. I feel the same as I did yesterday. Should I just continue to monitor at this point since you just checked everything yesterday?  Thank you, Nicole

## 2022-04-21 ENCOUNTER — ROUTINE PRENATAL (OUTPATIENT)
Dept: OBSTETRICS AND GYNECOLOGY | Age: 27
End: 2022-04-21

## 2022-04-21 VITALS — WEIGHT: 130 LBS | DIASTOLIC BLOOD PRESSURE: 66 MMHG | SYSTOLIC BLOOD PRESSURE: 108 MMHG | BODY MASS INDEX: 22.31 KG/M2

## 2022-04-21 DIAGNOSIS — F41.9 ANXIETY: ICD-10-CM

## 2022-04-21 DIAGNOSIS — Z87.59 HISTORY OF PRIOR PREGNANCY WITH IUGR NEWBORN: ICD-10-CM

## 2022-04-21 DIAGNOSIS — O46.90 VAGINAL BLEEDING IN PREGNANCY: ICD-10-CM

## 2022-04-21 DIAGNOSIS — K21.9 GASTROESOPHAGEAL REFLUX DISEASE WITHOUT ESOPHAGITIS: ICD-10-CM

## 2022-04-21 DIAGNOSIS — Z98.891 PREVIOUS CESAREAN SECTION: ICD-10-CM

## 2022-04-21 DIAGNOSIS — Z13.89 SCREENING FOR BLOOD OR PROTEIN IN URINE: Primary | ICD-10-CM

## 2022-04-21 LAB
GLUCOSE UR STRIP-MCNC: NEGATIVE MG/DL
PROT UR STRIP-MCNC: NEGATIVE MG/DL

## 2022-04-21 PROCEDURE — 76819 FETAL BIOPHYS PROFIL W/O NST: CPT | Performed by: OBSTETRICS & GYNECOLOGY

## 2022-04-21 PROCEDURE — 0502F SUBSEQUENT PRENATAL CARE: CPT | Performed by: OBSTETRICS & GYNECOLOGY

## 2022-04-21 RX ORDER — ASPIRIN 81 MG/1
TABLET ORAL
Qty: 90 TABLET | Refills: 2 | Status: ON HOLD | OUTPATIENT
Start: 2022-04-21 | End: 2022-05-31

## 2022-04-21 NOTE — PROGRESS NOTES
Patient was seen twice last week with spotting.  She did have extraplacental lobe noted.  She has had no bleeding since last week.  She has stopped running.  She feels excellent fetal movement    Biophysical profile is 8 out of 8.  WENDY is 15.  Blood pressure 108/66 with no protein  Weight gain of 24 pounds  Fundal height is appropriate at 33.    Assessment-32 weeks  Vaginal bleeding-it has resolved.  Recommend kick counts.  BPP is reassuring today.  Will add one on for next week  History of IUGR-recheck fetal weight at 36 weeks.  Last weight was normal.  Continue low-dose aspirin.  Repeat  section is scheduled for May 31.  Reflux-continue Prilosec

## 2022-04-27 ENCOUNTER — HOSPITAL ENCOUNTER (OUTPATIENT)
Dept: ULTRASOUND IMAGING | Facility: HOSPITAL | Age: 27
Discharge: HOME | End: 2022-04-27
Admitting: OBSTETRICS & GYNECOLOGY

## 2022-04-27 DIAGNOSIS — R92.8 ABNORMAL MAMMOGRAM: ICD-10-CM

## 2022-04-27 PROCEDURE — 76642 ULTRASOUND BREAST LIMITED: CPT

## 2022-04-28 ENCOUNTER — ROUTINE PRENATAL (OUTPATIENT)
Dept: OBSTETRICS AND GYNECOLOGY | Age: 27
End: 2022-04-28

## 2022-04-28 ENCOUNTER — TELEPHONE (OUTPATIENT)
Dept: OBSTETRICS AND GYNECOLOGY | Age: 27
End: 2022-04-28

## 2022-04-28 VITALS — SYSTOLIC BLOOD PRESSURE: 102 MMHG | DIASTOLIC BLOOD PRESSURE: 65 MMHG | WEIGHT: 132 LBS | BODY MASS INDEX: 22.66 KG/M2

## 2022-04-28 DIAGNOSIS — K21.9 GASTROESOPHAGEAL REFLUX DISEASE WITHOUT ESOPHAGITIS: ICD-10-CM

## 2022-04-28 DIAGNOSIS — Z13.89 SCREENING FOR BLOOD OR PROTEIN IN URINE: Primary | ICD-10-CM

## 2022-04-28 DIAGNOSIS — O46.90 VAGINAL BLEEDING IN PREGNANCY: ICD-10-CM

## 2022-04-28 DIAGNOSIS — N60.02 BENIGN BREAST CYST IN FEMALE, LEFT: Primary | ICD-10-CM

## 2022-04-28 DIAGNOSIS — Z3A.34 34 WEEKS GESTATION OF PREGNANCY: ICD-10-CM

## 2022-04-28 DIAGNOSIS — Z87.59 HISTORY OF PRIOR PREGNANCY WITH IUGR NEWBORN: ICD-10-CM

## 2022-04-28 DIAGNOSIS — Z98.891 PREVIOUS CESAREAN SECTION: ICD-10-CM

## 2022-04-28 DIAGNOSIS — N60.02 BREAST CYST, LEFT: ICD-10-CM

## 2022-04-28 LAB
GLUCOSE UR STRIP-MCNC: NEGATIVE MG/DL
PROT UR STRIP-MCNC: NEGATIVE MG/DL
RBC # UR STRIP: NEGATIVE /UL

## 2022-04-28 PROCEDURE — 0502F SUBSEQUENT PRENATAL CARE: CPT | Performed by: OBSTETRICS & GYNECOLOGY

## 2022-04-28 NOTE — TELEPHONE ENCOUNTER
----- Message from Phillip Palomo MD sent at 4/28/2022  8:38 AM EDT -----  Please notify the cyst in the left breast is unchanged consistent with a stable benign complex cyst.  They do recommend a another look at it in 9 months.  I will order an ultrasound for 9 months from now.

## 2022-04-28 NOTE — PROGRESS NOTES
Patient has not had any recurrence of vaginal bleeding.  Baby is moving very actively.  Patient had repeat imaging of the left breast cyst which came back as a complex cyst stable cyst.  Repeat ultrasound was recommended in 9 months.    2 pound weight gain  Weight gain for pregnancy is normal  Biophysical profile is 8 out of 8.  WENDY is 17.  Baby is vertex  Placenta is posterior.  Fundal height is appropriate  Blood pressure 102/65 with no protein    Assessment-34 weeks  Vaginal bleeding-resolved.  Continue kick counts.  Extralobar the placenta noted on ultrasound  History of IUGR-recheck fetal weight at 36 weeks.  Last fetal weight was normal.  Continue low-dose aspirin  Repeat  section is scheduled for May 31.

## 2022-05-12 ENCOUNTER — ROUTINE PRENATAL (OUTPATIENT)
Dept: OBSTETRICS AND GYNECOLOGY | Age: 27
End: 2022-05-12

## 2022-05-12 VITALS — WEIGHT: 132 LBS | BODY MASS INDEX: 22.66 KG/M2 | SYSTOLIC BLOOD PRESSURE: 108 MMHG | DIASTOLIC BLOOD PRESSURE: 72 MMHG

## 2022-05-12 DIAGNOSIS — Z13.89 SCREENING FOR BLOOD OR PROTEIN IN URINE: Primary | ICD-10-CM

## 2022-05-12 DIAGNOSIS — Z98.891 PREVIOUS CESAREAN SECTION: ICD-10-CM

## 2022-05-12 DIAGNOSIS — Z36.85 ANTENATAL SCREENING FOR STREPTOCOCCUS B: ICD-10-CM

## 2022-05-12 DIAGNOSIS — O46.90 VAGINAL BLEEDING IN PREGNANCY: ICD-10-CM

## 2022-05-12 LAB
GLUCOSE UR STRIP-MCNC: NEGATIVE MG/DL
PROT UR STRIP-MCNC: NEGATIVE MG/DL

## 2022-05-12 PROCEDURE — 0502F SUBSEQUENT PRENATAL CARE: CPT | Performed by: OBSTETRICS & GYNECOLOGY

## 2022-05-12 NOTE — PROGRESS NOTES
The patient is feeling well.  She feels good fetal movements.  She plans to work until the Friday before her .    Ultrasound shows normal fetal growth.  Estimated fetal weight is 6 pounds 9 ounces at the 60th percentile.  Abdominal circumference at the 68th percentile.  WENDY is 15.  Baby is vertex  Blood pressure 108/72 with no protein  Weight gain for pregnancy at 26 pounds weight gain is normal  Group B strep swab is collected  Cervix is closed thick and posterior    Assessment-36 weeks  History of IUGR-normal fetal weight today.  Continue low-dose aspirin  Vaginal bleeding-resolved.  Continue kick counts  Extralobar the placenta was noted on previous ultrasound.  Repeat  is scheduled for May 31

## 2022-05-16 PROBLEM — O99.820 GBS (GROUP B STREPTOCOCCUS CARRIER), +RV CULTURE, CURRENTLY PREGNANT: Status: ACTIVE | Noted: 2022-05-16

## 2022-05-16 LAB
CLINDAMYCIN ISLT KB: ABNORMAL
GP B STREP DNA SPEC QL NAA+PROBE: POSITIVE
ORGANISM ID: ABNORMAL

## 2022-05-19 ENCOUNTER — ROUTINE PRENATAL (OUTPATIENT)
Dept: OBSTETRICS AND GYNECOLOGY | Age: 27
End: 2022-05-19

## 2022-05-19 VITALS — WEIGHT: 134.4 LBS | SYSTOLIC BLOOD PRESSURE: 108 MMHG | BODY MASS INDEX: 23.07 KG/M2 | DIASTOLIC BLOOD PRESSURE: 70 MMHG

## 2022-05-19 DIAGNOSIS — Z87.59 HISTORY OF PRIOR PREGNANCY WITH IUGR NEWBORN: ICD-10-CM

## 2022-05-19 DIAGNOSIS — Z13.89 SCREENING FOR BLOOD OR PROTEIN IN URINE: Primary | ICD-10-CM

## 2022-05-19 DIAGNOSIS — Z98.891 PREVIOUS CESAREAN SECTION: ICD-10-CM

## 2022-05-19 DIAGNOSIS — O99.820 GBS (GROUP B STREPTOCOCCUS CARRIER), +RV CULTURE, CURRENTLY PREGNANT: ICD-10-CM

## 2022-05-19 DIAGNOSIS — K21.9 GASTROESOPHAGEAL REFLUX DISEASE WITHOUT ESOPHAGITIS: ICD-10-CM

## 2022-05-19 LAB
GLUCOSE UR STRIP-MCNC: NEGATIVE MG/DL
PROT UR STRIP-MCNC: NEGATIVE MG/DL

## 2022-05-19 PROCEDURE — 0502F SUBSEQUENT PRENATAL CARE: CPT | Performed by: OBSTETRICS & GYNECOLOGY

## 2022-05-19 NOTE — PROGRESS NOTES
Patient is feeling a little bit more pressure and back pain.  No contractions.  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  Baby is moving well.    Group B strep is positive  Cervix is closed thick and posterior  Blood pressure 108/70 with no protein in  Doppler heart tones are positive and fundal height is appropriate    Assessment-37 weeks  History of IUGR- normal weight at 36 weeks on ultrasound at 6 pounds 9 ounces at the 60th percentile.  Continue kick counts and low-dose aspirin  Vaginal bleeding-resolved  Recommend patient stop work next Thursday.  Prior  section-repeat  section is scheduled for May 31 Tuesday.

## 2022-05-26 ENCOUNTER — ROUTINE PRENATAL (OUTPATIENT)
Dept: OBSTETRICS AND GYNECOLOGY | Age: 27
End: 2022-05-26

## 2022-05-26 VITALS — WEIGHT: 134 LBS | BODY MASS INDEX: 23 KG/M2 | SYSTOLIC BLOOD PRESSURE: 112 MMHG | DIASTOLIC BLOOD PRESSURE: 72 MMHG

## 2022-05-26 DIAGNOSIS — O99.820 GBS (GROUP B STREPTOCOCCUS CARRIER), +RV CULTURE, CURRENTLY PREGNANT: ICD-10-CM

## 2022-05-26 DIAGNOSIS — K21.9 GASTROESOPHAGEAL REFLUX DISEASE WITHOUT ESOPHAGITIS: ICD-10-CM

## 2022-05-26 DIAGNOSIS — Z36.85 ANTENATAL SCREENING FOR STREPTOCOCCUS B: Primary | ICD-10-CM

## 2022-05-26 DIAGNOSIS — Z98.891 PREVIOUS CESAREAN SECTION: ICD-10-CM

## 2022-05-26 LAB
GLUCOSE UR STRIP-MCNC: NEGATIVE MG/DL
PROT UR STRIP-MCNC: NEGATIVE MG/DL

## 2022-05-26 PROCEDURE — 0502F SUBSEQUENT PRENATAL CARE: CPT | Performed by: OBSTETRICS & GYNECOLOGY

## 2022-05-26 NOTE — PROGRESS NOTES
Patient had some questions about discharge from the hospital after .  She also has a keloid and was wondering if it could be removed.  She is feeling good fetal movements.  A few Sumner Beyer contractions.    Doppler tones are positive and fundal height is appropriate  Blood pressure 112/72 with no protein  Abdomen-there is a keloid that does tilt up on the left.    Assessment-38 weeks  History of IUGR with normal weight by ultrasound.  Continue kick counts and low-dose aspirin  Vaginal bleeding resolved  Keloid-discussed removal at time of   Discussed  and discharged from the hospital.

## 2022-05-31 ENCOUNTER — HOSPITAL ENCOUNTER (INPATIENT)
Facility: HOSPITAL | Age: 27
LOS: 2 days | Discharge: HOME | End: 2022-06-02
Attending: OBSTETRICS & GYNECOLOGY | Admitting: OBSTETRICS & GYNECOLOGY

## 2022-05-31 ENCOUNTER — ANESTHESIA EVENT (OUTPATIENT)
Dept: LABOR AND DELIVERY | Facility: HOSPITAL | Age: 27
End: 2022-05-31

## 2022-05-31 ENCOUNTER — ANESTHESIA (OUTPATIENT)
Dept: LABOR AND DELIVERY | Facility: HOSPITAL | Age: 27
End: 2022-05-31

## 2022-05-31 DIAGNOSIS — Z98.891 PREVIOUS CESAREAN SECTION: ICD-10-CM

## 2022-05-31 DIAGNOSIS — Z98.891 H/O CESAREAN SECTION: Primary | ICD-10-CM

## 2022-05-31 LAB
ABO GROUP BLD: NORMAL
BLD GP AB SCN SERPL QL: NEGATIVE
DEPRECATED RDW RBC AUTO: 41.5 FL (ref 37–54)
ERYTHROCYTE [DISTWIDTH] IN BLOOD BY AUTOMATED COUNT: 12.7 % (ref 12.3–15.4)
HCT VFR BLD AUTO: 40 % (ref 34–46.6)
HGB BLD-MCNC: 14.1 G/DL (ref 12–15.9)
MCH RBC QN AUTO: 31.7 PG (ref 26.6–33)
MCHC RBC AUTO-ENTMCNC: 35.3 G/DL (ref 31.5–35.7)
MCV RBC AUTO: 89.9 FL (ref 79–97)
PLATELET # BLD AUTO: 167 10*3/MM3 (ref 140–450)
PMV BLD AUTO: 10.1 FL (ref 6–12)
RBC # BLD AUTO: 4.45 10*6/MM3 (ref 3.77–5.28)
RH BLD: POSITIVE
SARS-COV-2 RNA PNL SPEC NAA+PROBE: NOT DETECTED
T&S EXPIRATION DATE: NORMAL
WBC NRBC COR # BLD: 8.11 10*3/MM3 (ref 3.4–10.8)

## 2022-05-31 PROCEDURE — 25010000002 FENTANYL CITRATE (PF) 50 MCG/ML SOLUTION: Performed by: ANESTHESIOLOGY

## 2022-05-31 PROCEDURE — S0260 H&P FOR SURGERY: HCPCS | Performed by: OBSTETRICS & GYNECOLOGY

## 2022-05-31 PROCEDURE — 25010000002 PHENYLEPHRINE 10 MG/ML SOLUTION: Performed by: NURSE ANESTHETIST, CERTIFIED REGISTERED

## 2022-05-31 PROCEDURE — 87635 SARS-COV-2 COVID-19 AMP PRB: CPT | Performed by: OBSTETRICS & GYNECOLOGY

## 2022-05-31 PROCEDURE — 85027 COMPLETE CBC AUTOMATED: CPT | Performed by: OBSTETRICS & GYNECOLOGY

## 2022-05-31 PROCEDURE — 25010000002 MORPHINE PER 10 MG: Performed by: NURSE ANESTHETIST, CERTIFIED REGISTERED

## 2022-05-31 PROCEDURE — 25010000002 ONDANSETRON PER 1 MG: Performed by: ANESTHESIOLOGY

## 2022-05-31 PROCEDURE — 86850 RBC ANTIBODY SCREEN: CPT | Performed by: OBSTETRICS & GYNECOLOGY

## 2022-05-31 PROCEDURE — 59510 CESAREAN DELIVERY: CPT | Performed by: OBSTETRICS & GYNECOLOGY

## 2022-05-31 PROCEDURE — 88307 TISSUE EXAM BY PATHOLOGIST: CPT

## 2022-05-31 PROCEDURE — 86900 BLOOD TYPING SEROLOGIC ABO: CPT | Performed by: OBSTETRICS & GYNECOLOGY

## 2022-05-31 PROCEDURE — 25010000002 MORPHINE PER 10 MG: Performed by: ANESTHESIOLOGY

## 2022-05-31 PROCEDURE — 86901 BLOOD TYPING SEROLOGIC RH(D): CPT | Performed by: OBSTETRICS & GYNECOLOGY

## 2022-05-31 PROCEDURE — 25010000002 CEFAZOLIN IN DEXTROSE 2-4 GM/100ML-% SOLUTION: Performed by: OBSTETRICS & GYNECOLOGY

## 2022-05-31 RX ORDER — OXYTOCIN/0.9 % SODIUM CHLORIDE 30/500 ML
125 PLASTIC BAG, INJECTION (ML) INTRAVENOUS CONTINUOUS PRN
Status: COMPLETED | OUTPATIENT
Start: 2022-05-31 | End: 2022-05-31

## 2022-05-31 RX ORDER — SIMETHICONE 80 MG
80 TABLET,CHEWABLE ORAL
Status: DISCONTINUED | OUTPATIENT
Start: 2022-05-31 | End: 2022-06-02 | Stop reason: HOSPADM

## 2022-05-31 RX ORDER — DIPHENHYDRAMINE HYDROCHLORIDE 50 MG/ML
25 INJECTION INTRAMUSCULAR; INTRAVENOUS EVERY 4 HOURS PRN
Status: DISCONTINUED | OUTPATIENT
Start: 2022-05-31 | End: 2022-06-02 | Stop reason: HOSPADM

## 2022-05-31 RX ORDER — OXYTOCIN/0.9 % SODIUM CHLORIDE 30/500 ML
999 PLASTIC BAG, INJECTION (ML) INTRAVENOUS ONCE
Status: DISCONTINUED | OUTPATIENT
Start: 2022-05-31 | End: 2022-05-31 | Stop reason: HOSPADM

## 2022-05-31 RX ORDER — ONDANSETRON 2 MG/ML
4 INJECTION INTRAMUSCULAR; INTRAVENOUS ONCE AS NEEDED
Status: COMPLETED | OUTPATIENT
Start: 2022-05-31 | End: 2022-05-31

## 2022-05-31 RX ORDER — HYDROXYZINE 50 MG/1
50 TABLET, FILM COATED ORAL EVERY 6 HOURS PRN
Status: DISCONTINUED | OUTPATIENT
Start: 2022-05-31 | End: 2022-06-02 | Stop reason: HOSPADM

## 2022-05-31 RX ORDER — ACETAMINOPHEN 500 MG
1000 TABLET ORAL EVERY 6 HOURS PRN
Status: DISCONTINUED | OUTPATIENT
Start: 2022-05-31 | End: 2022-06-02 | Stop reason: HOSPADM

## 2022-05-31 RX ORDER — MISOPROSTOL 200 UG/1
800 TABLET ORAL AS NEEDED
Status: DISCONTINUED | OUTPATIENT
Start: 2022-05-31 | End: 2022-05-31 | Stop reason: HOSPADM

## 2022-05-31 RX ORDER — SODIUM CHLORIDE 0.9 % (FLUSH) 0.9 %
1-10 SYRINGE (ML) INJECTION AS NEEDED
Status: DISCONTINUED | OUTPATIENT
Start: 2022-05-31 | End: 2022-05-31 | Stop reason: HOSPADM

## 2022-05-31 RX ORDER — SODIUM CHLORIDE 0.9 % (FLUSH) 0.9 %
10 SYRINGE (ML) INJECTION EVERY 12 HOURS SCHEDULED
Status: DISCONTINUED | OUTPATIENT
Start: 2022-05-31 | End: 2022-05-31 | Stop reason: HOSPADM

## 2022-05-31 RX ORDER — BUPIVACAINE HYDROCHLORIDE 7.5 MG/ML
INJECTION, SOLUTION EPIDURAL; RETROBULBAR
Status: COMPLETED | OUTPATIENT
Start: 2022-05-31 | End: 2022-05-31

## 2022-05-31 RX ORDER — ONDANSETRON 2 MG/ML
4 INJECTION INTRAMUSCULAR; INTRAVENOUS ONCE AS NEEDED
Status: DISCONTINUED | OUTPATIENT
Start: 2022-05-31 | End: 2022-06-02 | Stop reason: HOSPADM

## 2022-05-31 RX ORDER — NALOXONE HCL 0.4 MG/ML
0.2 VIAL (ML) INJECTION
Status: DISCONTINUED | OUTPATIENT
Start: 2022-05-31 | End: 2022-06-02 | Stop reason: HOSPADM

## 2022-05-31 RX ORDER — CETIRIZINE HYDROCHLORIDE 10 MG/1
10 TABLET ORAL DAILY
Status: DISCONTINUED | OUTPATIENT
Start: 2022-05-31 | End: 2022-06-02 | Stop reason: HOSPADM

## 2022-05-31 RX ORDER — OXYCODONE AND ACETAMINOPHEN 10; 325 MG/1; MG/1
1 TABLET ORAL EVERY 4 HOURS PRN
Status: DISCONTINUED | OUTPATIENT
Start: 2022-05-31 | End: 2022-06-02 | Stop reason: HOSPADM

## 2022-05-31 RX ORDER — ERYTHROMYCIN 5 MG/G
OINTMENT OPHTHALMIC
Status: ACTIVE
Start: 2022-05-31 | End: 2022-05-31

## 2022-05-31 RX ORDER — HYDROCORTISONE 25 MG/G
CREAM TOPICAL 3 TIMES DAILY PRN
Status: DISCONTINUED | OUTPATIENT
Start: 2022-05-31 | End: 2022-06-02 | Stop reason: HOSPADM

## 2022-05-31 RX ORDER — CEFAZOLIN SODIUM 2 G/100ML
2 INJECTION, SOLUTION INTRAVENOUS ONCE
Status: COMPLETED | OUTPATIENT
Start: 2022-05-31 | End: 2022-05-31

## 2022-05-31 RX ORDER — OXYTOCIN/0.9 % SODIUM CHLORIDE 30/500 ML
125 PLASTIC BAG, INJECTION (ML) INTRAVENOUS CONTINUOUS PRN
Status: DISCONTINUED | OUTPATIENT
Start: 2022-05-31 | End: 2022-06-02 | Stop reason: HOSPADM

## 2022-05-31 RX ORDER — HYDROMORPHONE HYDROCHLORIDE 1 MG/ML
0.5 INJECTION, SOLUTION INTRAMUSCULAR; INTRAVENOUS; SUBCUTANEOUS
Status: DISCONTINUED | OUTPATIENT
Start: 2022-05-31 | End: 2022-05-31 | Stop reason: HOSPADM

## 2022-05-31 RX ORDER — PHYTONADIONE 1 MG/.5ML
INJECTION, EMULSION INTRAMUSCULAR; INTRAVENOUS; SUBCUTANEOUS
Status: ACTIVE
Start: 2022-05-31 | End: 2022-05-31

## 2022-05-31 RX ORDER — MORPHINE SULFATE 1 MG/ML
INJECTION, SOLUTION EPIDURAL; INTRATHECAL; INTRAVENOUS
Status: COMPLETED | OUTPATIENT
Start: 2022-05-31 | End: 2022-05-31

## 2022-05-31 RX ORDER — IBUPROFEN 800 MG/1
800 TABLET ORAL EVERY 6 HOURS PRN
Status: DISCONTINUED | OUTPATIENT
Start: 2022-05-31 | End: 2022-06-02 | Stop reason: HOSPADM

## 2022-05-31 RX ORDER — LIDOCAINE HYDROCHLORIDE 10 MG/ML
5 INJECTION, SOLUTION EPIDURAL; INFILTRATION; INTRACAUDAL; PERINEURAL AS NEEDED
Status: DISCONTINUED | OUTPATIENT
Start: 2022-05-31 | End: 2022-05-31 | Stop reason: HOSPADM

## 2022-05-31 RX ORDER — ACETAMINOPHEN 500 MG
1000 TABLET ORAL ONCE
Status: COMPLETED | OUTPATIENT
Start: 2022-05-31 | End: 2022-05-31

## 2022-05-31 RX ORDER — PHENYLEPHRINE HYDROCHLORIDE 10 MG/ML
INJECTION INTRAVENOUS AS NEEDED
Status: DISCONTINUED | OUTPATIENT
Start: 2022-05-31 | End: 2022-05-31 | Stop reason: SURG

## 2022-05-31 RX ORDER — METHYLERGONOVINE MALEATE 0.2 MG/ML
200 INJECTION INTRAVENOUS AS NEEDED
Status: DISCONTINUED | OUTPATIENT
Start: 2022-05-31 | End: 2022-05-31 | Stop reason: HOSPADM

## 2022-05-31 RX ORDER — OXYTOCIN/0.9 % SODIUM CHLORIDE 30/500 ML
250 PLASTIC BAG, INJECTION (ML) INTRAVENOUS CONTINUOUS PRN
Status: ACTIVE | OUTPATIENT
Start: 2022-05-31 | End: 2022-05-31

## 2022-05-31 RX ORDER — EPHEDRINE SULFATE 50 MG/ML
INJECTION, SOLUTION INTRAVENOUS AS NEEDED
Status: DISCONTINUED | OUTPATIENT
Start: 2022-05-31 | End: 2022-05-31 | Stop reason: SURG

## 2022-05-31 RX ORDER — MORPHINE SULFATE 2 MG/ML
2 INJECTION, SOLUTION INTRAMUSCULAR; INTRAVENOUS
Status: DISPENSED | OUTPATIENT
Start: 2022-05-31 | End: 2022-06-01

## 2022-05-31 RX ORDER — OXYCODONE HYDROCHLORIDE AND ACETAMINOPHEN 5; 325 MG/1; MG/1
1 TABLET ORAL EVERY 4 HOURS PRN
Status: DISCONTINUED | OUTPATIENT
Start: 2022-05-31 | End: 2022-06-02 | Stop reason: HOSPADM

## 2022-05-31 RX ORDER — DOCUSATE SODIUM 100 MG/1
100 CAPSULE, LIQUID FILLED ORAL 2 TIMES DAILY
Status: DISCONTINUED | OUTPATIENT
Start: 2022-05-31 | End: 2022-06-02 | Stop reason: HOSPADM

## 2022-05-31 RX ORDER — FENTANYL CITRATE 50 UG/ML
INJECTION, SOLUTION INTRAMUSCULAR; INTRAVENOUS
Status: COMPLETED | OUTPATIENT
Start: 2022-05-31 | End: 2022-05-31

## 2022-05-31 RX ORDER — SODIUM CHLORIDE, SODIUM LACTATE, POTASSIUM CHLORIDE, CALCIUM CHLORIDE 600; 310; 30; 20 MG/100ML; MG/100ML; MG/100ML; MG/100ML
125 INJECTION, SOLUTION INTRAVENOUS CONTINUOUS
Status: DISCONTINUED | OUTPATIENT
Start: 2022-05-31 | End: 2022-05-31

## 2022-05-31 RX ORDER — DOCUSATE SODIUM 100 MG/1
100 CAPSULE, LIQUID FILLED ORAL 2 TIMES DAILY
COMMUNITY
End: 2022-07-12

## 2022-05-31 RX ORDER — CARBOPROST TROMETHAMINE 250 UG/ML
250 INJECTION, SOLUTION INTRAMUSCULAR AS NEEDED
Status: DISCONTINUED | OUTPATIENT
Start: 2022-05-31 | End: 2022-05-31 | Stop reason: HOSPADM

## 2022-05-31 RX ORDER — DIPHENHYDRAMINE HCL 25 MG
25 CAPSULE ORAL EVERY 4 HOURS PRN
Status: DISCONTINUED | OUTPATIENT
Start: 2022-05-31 | End: 2022-06-02 | Stop reason: HOSPADM

## 2022-05-31 RX ORDER — ONDANSETRON 2 MG/ML
4 INJECTION INTRAMUSCULAR; INTRAVENOUS EVERY 6 HOURS PRN
Status: DISCONTINUED | OUTPATIENT
Start: 2022-05-31 | End: 2022-06-02 | Stop reason: HOSPADM

## 2022-05-31 RX ADMIN — OXYCODONE AND ACETAMINOPHEN 1 TABLET: 5; 325 TABLET ORAL at 23:11

## 2022-05-31 RX ADMIN — SIMETHICONE 80 MG: 80 TABLET, CHEWABLE ORAL at 19:04

## 2022-05-31 RX ADMIN — SODIUM CHLORIDE, POTASSIUM CHLORIDE, SODIUM LACTATE AND CALCIUM CHLORIDE 125 ML/HR: 600; 310; 30; 20 INJECTION, SOLUTION INTRAVENOUS at 10:04

## 2022-05-31 RX ADMIN — IBUPROFEN 800 MG: 800 TABLET, FILM COATED ORAL at 15:38

## 2022-05-31 RX ADMIN — SIMETHICONE 80 MG: 80 TABLET, CHEWABLE ORAL at 23:11

## 2022-05-31 RX ADMIN — IBUPROFEN 800 MG: 800 TABLET, FILM COATED ORAL at 20:58

## 2022-05-31 RX ADMIN — PHENYLEPHRINE HYDROCHLORIDE 100 MCG: 10 INJECTION, SOLUTION INTRAVENOUS at 10:41

## 2022-05-31 RX ADMIN — EPHEDRINE SULFATE 10 MG: 50 INJECTION INTRAVENOUS at 10:39

## 2022-05-31 RX ADMIN — OXYCODONE AND ACETAMINOPHEN 1 TABLET: 5; 325 TABLET ORAL at 19:04

## 2022-05-31 RX ADMIN — CEFAZOLIN SODIUM 2 G: 2 INJECTION, SOLUTION INTRAVENOUS at 10:15

## 2022-05-31 RX ADMIN — Medication 999 ML/HR: at 10:53

## 2022-05-31 RX ADMIN — ONDANSETRON 4 MG: 2 INJECTION INTRAMUSCULAR; INTRAVENOUS at 10:07

## 2022-05-31 RX ADMIN — PHENYLEPHRINE HYDROCHLORIDE 100 MCG: 10 INJECTION, SOLUTION INTRAVENOUS at 10:40

## 2022-05-31 RX ADMIN — Medication 125 ML/HR: at 12:20

## 2022-05-31 RX ADMIN — DOCUSATE SODIUM 100 MG: 100 CAPSULE, LIQUID FILLED ORAL at 20:58

## 2022-05-31 RX ADMIN — MORPHINE SULFATE 2 MG: 2 INJECTION, SOLUTION INTRAMUSCULAR; INTRAVENOUS at 13:20

## 2022-05-31 RX ADMIN — MORPHINE SULFATE 100 MCG: 1 INJECTION, SOLUTION EPIDURAL; INTRATHECAL; INTRAVENOUS at 10:36

## 2022-05-31 RX ADMIN — EPHEDRINE SULFATE 10 MG: 50 INJECTION INTRAVENOUS at 10:38

## 2022-05-31 RX ADMIN — SODIUM CHLORIDE, POTASSIUM CHLORIDE, SODIUM LACTATE AND CALCIUM CHLORIDE 1000 ML: 600; 310; 30; 20 INJECTION, SOLUTION INTRAVENOUS at 07:46

## 2022-05-31 RX ADMIN — BUPIVACAINE HYDROCHLORIDE 1.4 ML: 7.5 INJECTION, SOLUTION EPIDURAL; RETROBULBAR at 10:36

## 2022-05-31 RX ADMIN — ACETAMINOPHEN 1000 MG: 500 TABLET ORAL at 10:14

## 2022-05-31 RX ADMIN — SODIUM CHLORIDE, POTASSIUM CHLORIDE, SODIUM LACTATE AND CALCIUM CHLORIDE: 600; 310; 30; 20 INJECTION, SOLUTION INTRAVENOUS at 11:37

## 2022-05-31 RX ADMIN — FENTANYL CITRATE 10 MCG: 0.05 INJECTION, SOLUTION INTRAMUSCULAR; INTRAVENOUS at 10:36

## 2022-05-31 RX ADMIN — EPHEDRINE SULFATE 10 MG: 50 INJECTION INTRAVENOUS at 10:37

## 2022-05-31 NOTE — ANESTHESIA PREPROCEDURE EVALUATION
Anesthesia Evaluation                  Airway   Mallampati: II  TM distance: >3 FB  Neck ROM: full  Dental      Pulmonary    (-) wheezes  Cardiovascular     Rhythm: regular        Neuro/Psych  GI/Hepatic/Renal/Endo    (+)  GERD,      Musculoskeletal     Abdominal    Substance History      OB/GYN    (+) Pregnant,     Comment: 39 0/7      Other                        Anesthesia Plan    ASA 2     spinal   (Previous CS under epidural    Discussed with patient SAB and risk of PDPH, the possible need for blood patch in the future, discomfort during placement or delivery, SOA, possible failed SAB and need for GETA and the risks associated with GA and CS, N&V, and itching from intrathecal morphine.  Patient understands and wishes to have SAB for CS.)    Anesthetic plan, all risks, benefits, and alternatives have been provided, discussed and informed consent has been obtained with: patient.        CODE STATUS:    Level Of Support Discussed With: Patient  Code Status (Patient has no pulse and is not breathing): CPR (Attempt to Resuscitate)  Medical Interventions (Patient has pulse or is breathing): Full Support

## 2022-05-31 NOTE — ANESTHESIA PROCEDURE NOTES
Spinal Block      Patient reassessed immediately prior to procedure    Start Time: 5/31/2022 10:30 AM  Stop Time: 5/31/2022 10:36 AM  Performed By  CRNA/CAA: Cheri Menezes CRNA  Preanesthetic Checklist  Completed: patient identified, IV checked, site marked, risks and benefits discussed, surgical consent, monitors and equipment checked, pre-op evaluation and timeout performed  Spinal Block Prep:  Patient Position:sitting  Sterile Tech:cap, gloves, sterile barriers and mask  Prep:Chloraprep  Patient Monitoring:EKG, continuous pulse oximetry and blood pressure monitoring  Spinal Block Procedure  Approach:midline  Guidance:palpation technique  Location:L4-L5  Needle Type:Sobeida  Needle Gauge:25 G  Placement of Spinal needle event:cerebrospinal fluid aspirated    Fluid Appearance:clear  Medications: fentaNYL citrate (PF) (SUBLIMAZE) injection, 10 mcg  Morphine PF injection, 100 mcg  bupivacaine PF (MARCAINE) 0.75 % injection, 1.4 mL  Med Administered at 5/31/2022 10:36 AM   Post Assessment  Patient Tolerance:patient tolerated the procedure well with no apparent complications  Complications no

## 2022-05-31 NOTE — ANESTHESIA POSTPROCEDURE EVALUATION
"Patient: Anne Marie Patel    Procedure Summary     Date: 22 Room / Location:  [facility]    Anesthesia Start: 1024 Anesthesia Stop: 1147    Procedure:  SECTION REPEAT (N/A Abdomen) Diagnosis:       Previous  section      (Previous  section [Z98.891])    Surgeons: Phillip Palomo MD Provider: Anthony Solo MD    Anesthesia Type: spinal ASA Status: 2          Anesthesia Type: spinal    Vitals  Vitals Value Taken Time   /52 22 1345   Temp 37.1 °C (98.7 °F) 22 1328   Pulse 79 22 1345   Resp 16 22 1315   SpO2 98 % 22 1340   Vitals shown include unvalidated device data.        Post Anesthesia Care and Evaluation    Patient location during evaluation: bedside  Patient participation: complete - patient participated  Level of consciousness: awake and alert  Pain management: adequate  Airway patency: patent  Anesthetic complications: No anesthetic complications    Cardiovascular status: acceptable  Respiratory status: acceptable  Hydration status: acceptable    Comments: /67   Pulse 79   Temp 37.1 °C (98.7 °F) (Axillary)   Resp 16   Ht 160 cm (63\")   Wt 59 kg (130 lb)   LMP 2021 (Exact Date)   SpO2 98%   Breastfeeding Unknown   BMI 23.03 kg/m²       "

## 2022-06-01 LAB
BASOPHILS # BLD AUTO: 0.04 10*3/MM3 (ref 0–0.2)
BASOPHILS NFR BLD AUTO: 0.4 % (ref 0–1.5)
DEPRECATED RDW RBC AUTO: 42.9 FL (ref 37–54)
EOSINOPHIL # BLD AUTO: 0.13 10*3/MM3 (ref 0–0.4)
EOSINOPHIL NFR BLD AUTO: 1.2 % (ref 0.3–6.2)
ERYTHROCYTE [DISTWIDTH] IN BLOOD BY AUTOMATED COUNT: 12.7 % (ref 12.3–15.4)
HCT VFR BLD AUTO: 36.1 % (ref 34–46.6)
HGB BLD-MCNC: 12.1 G/DL (ref 12–15.9)
IMM GRANULOCYTES # BLD AUTO: 0.03 10*3/MM3 (ref 0–0.05)
IMM GRANULOCYTES NFR BLD AUTO: 0.3 % (ref 0–0.5)
LYMPHOCYTES # BLD AUTO: 2.06 10*3/MM3 (ref 0.7–3.1)
LYMPHOCYTES NFR BLD AUTO: 18.9 % (ref 19.6–45.3)
MCH RBC QN AUTO: 31.1 PG (ref 26.6–33)
MCHC RBC AUTO-ENTMCNC: 33.5 G/DL (ref 31.5–35.7)
MCV RBC AUTO: 92.8 FL (ref 79–97)
MONOCYTES # BLD AUTO: 0.7 10*3/MM3 (ref 0.1–0.9)
MONOCYTES NFR BLD AUTO: 6.4 % (ref 5–12)
NEUTROPHILS NFR BLD AUTO: 7.96 10*3/MM3 (ref 1.7–7)
NEUTROPHILS NFR BLD AUTO: 72.8 % (ref 42.7–76)
NRBC BLD AUTO-RTO: 0 /100 WBC (ref 0–0.2)
PLATELET # BLD AUTO: 163 10*3/MM3 (ref 140–450)
PMV BLD AUTO: 10 FL (ref 6–12)
RBC # BLD AUTO: 3.89 10*6/MM3 (ref 3.77–5.28)
WBC NRBC COR # BLD: 10.92 10*3/MM3 (ref 3.4–10.8)

## 2022-06-01 PROCEDURE — 85025 COMPLETE CBC W/AUTO DIFF WBC: CPT | Performed by: OBSTETRICS & GYNECOLOGY

## 2022-06-01 RX ADMIN — CETIRIZINE HYDROCHLORIDE 10 MG: 10 TABLET ORAL at 08:45

## 2022-06-01 RX ADMIN — DOCUSATE SODIUM 100 MG: 100 CAPSULE, LIQUID FILLED ORAL at 21:56

## 2022-06-01 RX ADMIN — SIMETHICONE 80 MG: 80 TABLET, CHEWABLE ORAL at 20:10

## 2022-06-01 RX ADMIN — IBUPROFEN 800 MG: 800 TABLET, FILM COATED ORAL at 21:56

## 2022-06-01 RX ADMIN — SIMETHICONE 80 MG: 80 TABLET, CHEWABLE ORAL at 07:48

## 2022-06-01 RX ADMIN — OXYCODONE AND ACETAMINOPHEN 1 TABLET: 5; 325 TABLET ORAL at 16:04

## 2022-06-01 RX ADMIN — OXYCODONE AND ACETAMINOPHEN 1 TABLET: 5; 325 TABLET ORAL at 07:48

## 2022-06-01 RX ADMIN — SIMETHICONE 80 MG: 80 TABLET, CHEWABLE ORAL at 12:05

## 2022-06-01 RX ADMIN — OXYCODONE HYDROCHLORIDE AND ACETAMINOPHEN 1 TABLET: 10; 325 TABLET ORAL at 12:05

## 2022-06-01 RX ADMIN — IBUPROFEN 800 MG: 800 TABLET, FILM COATED ORAL at 16:04

## 2022-06-01 RX ADMIN — DOCUSATE SODIUM 100 MG: 100 CAPSULE, LIQUID FILLED ORAL at 08:45

## 2022-06-01 RX ADMIN — OXYCODONE HYDROCHLORIDE AND ACETAMINOPHEN 1 TABLET: 10; 325 TABLET ORAL at 03:09

## 2022-06-01 RX ADMIN — SIMETHICONE 80 MG: 80 TABLET, CHEWABLE ORAL at 18:24

## 2022-06-01 RX ADMIN — IBUPROFEN 800 MG: 800 TABLET, FILM COATED ORAL at 10:04

## 2022-06-01 RX ADMIN — OXYCODONE HYDROCHLORIDE AND ACETAMINOPHEN 1 TABLET: 10; 325 TABLET ORAL at 20:10

## 2022-06-01 RX ADMIN — IBUPROFEN 800 MG: 800 TABLET, FILM COATED ORAL at 03:09

## 2022-06-01 NOTE — LACTATION NOTE
Mom wanting latch check. Baby is latching well. Baby doesn't appear to have tongue tie but may have slight lip tie. Encouraged mom to f/u with pediatrician. Call LC as needed    Lactation Consult Note    Evaluation Completed: 2022 14:20 EDT  Patient Name: Anne Marie Patel  :  1995  MRN:  [id number]     REFERRAL  INFORMATION:                                         DELIVERY HISTORY:        Skin to skin initiation date/time: 2022  11:01 AM   Skin to skin end date/time: 2022  1:10 PM        MATERNAL ASSESSMENT:                               INFANT ASSESSMENT:  Information for the patient's :  Jorge NelsonOnurrene [[id number]]   No past medical history on file.                                                                                                     MATERNAL INFANT FEEDING:                                                                       EQUIPMENT TYPE:                                 BREAST PUMPING:          LACTATION REFERRALS:

## 2022-06-01 NOTE — PROGRESS NOTES
[facility]   PROGRESS NOTE    Post-Op Day 1 S/P   Subjective     Patient reports:  Pain is well controlled.  She is ambulating. Tolerating diet. Tolerating po -- normal.  Intake -- c/o of tolerating po solids.   Voiding - without difficulty;   Vaginal bleeding is light.    ROS:  GI: neg for n/v  : neg for heavy bleeding  Musculoskeletal: neg for leg pain    Objective      Vitals: Vital Signs Range for the last 24 hours  Temperature: Temp:  [96.8 °F (36 °C)-98.8 °F (37.1 °C)] 98.1 °F (36.7 °C)   Temp Source: Temp src: Oral   BP: BP: ()/(52-69) 100/62   Pulse: Heart Rate:  [58-81] 58   Respirations: Resp:  [16-18] 16   SPO2: SpO2:  [97 %-100 %] 98 %   O2 Amount (l/min):     O2 Devices Device (Oxygen Therapy): room air   Weight:              Physical Exam    Lungs normal respiratory effort   Abdomen Soft, fundus firm at U-1   Incision  Bandage in place, no surrounding erythema or swelling   Extremities bilateral lower extremities without edema, cords or tenderness     labs:  Lab Results   Component Value Date    WBC 10.92 (H) 2022    HGB 12.1 2022    HCT 36.1 2022    MCV 92.8 2022     2022     Results from last 7 days   Lab Units 22  0744   ABO TYPING  A   RH TYPING  Positive     Assessment & Plan        Previous  section    History of prior pregnancy with IUGR     Vaginal bleeding in pregnancy    Placenta succenturiate lobe affecting fetus    GBS (group B Streptococcus carrier), +RV culture, currently pregnant    H/O  section      Assessment:    Anne Marie Patel is Day 1  post-partum  , Low Transverse  : Pt is doing well and denies any issues today.     Circumcision requested. Reviewed risks to include bleeding, infection, damage to penis and need for revision. Also discussed elective nature of procedure. Patient notes understanding and requests today.      Plan:  continue post op care and ambulation  encouraged.        Libby Stokes MD  6/1/2022  12:26 EDT

## 2022-06-01 NOTE — PLAN OF CARE
Goal Outcome Evaluation:                 Problem: Adult Inpatient Plan of Care  Goal: Plan of Care Review  Outcome: Ongoing, Progressing  Flowsheets (Taken 6/1/2022 0352)  Progress: improving  Plan of Care Reviewed With:   patient   spouse  Outcome Evaluation: POD 1. VSS. Pain well managed. curiel in place. ambulating well. breastfeeding infant well

## 2022-06-02 VITALS
HEIGHT: 63 IN | TEMPERATURE: 98.5 F | SYSTOLIC BLOOD PRESSURE: 98 MMHG | WEIGHT: 130 LBS | RESPIRATION RATE: 16 BRPM | BODY MASS INDEX: 23.04 KG/M2 | DIASTOLIC BLOOD PRESSURE: 62 MMHG | HEART RATE: 65 BPM | OXYGEN SATURATION: 98 %

## 2022-06-02 RX ORDER — OXYCODONE HYDROCHLORIDE AND ACETAMINOPHEN 5; 325 MG/1; MG/1
1 TABLET ORAL EVERY 4 HOURS PRN
Qty: 25 TABLET | Refills: 0 | Status: SHIPPED | OUTPATIENT
Start: 2022-06-02 | End: 2022-06-07

## 2022-06-02 RX ORDER — IBUPROFEN 800 MG/1
800 TABLET ORAL EVERY 6 HOURS PRN
Qty: 50 TABLET | Refills: 1 | Status: SHIPPED | OUTPATIENT
Start: 2022-06-02 | End: 2022-07-12

## 2022-06-02 RX ADMIN — IBUPROFEN 800 MG: 800 TABLET, FILM COATED ORAL at 12:14

## 2022-06-02 RX ADMIN — DOCUSATE SODIUM 100 MG: 100 CAPSULE, LIQUID FILLED ORAL at 08:21

## 2022-06-02 RX ADMIN — CETIRIZINE HYDROCHLORIDE 10 MG: 10 TABLET ORAL at 11:04

## 2022-06-02 RX ADMIN — OXYCODONE HYDROCHLORIDE AND ACETAMINOPHEN 1 TABLET: 10; 325 TABLET ORAL at 16:13

## 2022-06-02 RX ADMIN — OXYCODONE HYDROCHLORIDE AND ACETAMINOPHEN 1 TABLET: 10; 325 TABLET ORAL at 12:15

## 2022-06-02 RX ADMIN — SIMETHICONE 80 MG: 80 TABLET, CHEWABLE ORAL at 08:21

## 2022-06-02 RX ADMIN — OXYCODONE HYDROCHLORIDE AND ACETAMINOPHEN 1 TABLET: 10; 325 TABLET ORAL at 03:49

## 2022-06-02 RX ADMIN — OXYCODONE HYDROCHLORIDE AND ACETAMINOPHEN 1 TABLET: 10; 325 TABLET ORAL at 08:21

## 2022-06-02 RX ADMIN — OXYCODONE HYDROCHLORIDE AND ACETAMINOPHEN 1 TABLET: 10; 325 TABLET ORAL at 00:06

## 2022-06-02 RX ADMIN — SIMETHICONE 80 MG: 80 TABLET, CHEWABLE ORAL at 11:04

## 2022-06-02 RX ADMIN — IBUPROFEN 800 MG: 800 TABLET, FILM COATED ORAL at 03:49

## 2022-06-02 NOTE — PLAN OF CARE
Goal Outcome Evaluation:              Outcome Evaluation: 2PO, VSS, ambulating well, voiding spontaneously, pain controlled with PO pain medications, breastfeeding infant, pt requests early d/c

## 2022-06-02 NOTE — PLAN OF CARE
Goal Outcome Evaluation:        Problem: Adult Inpatient Plan of Care  Goal: Plan of Care Review  Outcome: Ongoing, Progressing  Flowsheets  Taken 6/2/2022 0443 by Emelia Kramer RN  Outcome Evaluation: VSS. Bleeding scant to light. Lochia WNL. Pt ambulating well within room. Pt pain well controlled with medicaion. Pt beastfeeding well. Voiding without difficulty. Passing gas. Pt desires to be D/C home today.  Taken 6/1/2022 0245 by Chelly Caro, RN  Progress: improving  Plan of Care Reviewed With:   patient   spouse  Goal: Patient-Specific Goal (Individualized)  Outcome: Ongoing, Progressing  Goal: Absence of Hospital-Acquired Illness or Injury  Outcome: Ongoing, Progressing  Intervention: Identify and Manage Fall Risk  Recent Flowsheet Documentation  Taken 6/2/2022 0206 by Emelia Kramer RN  Safety Promotion/Fall Prevention:   assistive device/personal items within reach   clutter free environment maintained   fall prevention program maintained   nonskid shoes/slippers when out of bed   room organization consistent   safety round/check completed  Taken 6/2/2022 0050 by Emelia Kramer RN  Safety Promotion/Fall Prevention:   assistive device/personal items within reach   clutter free environment maintained   fall prevention program maintained   nonskid shoes/slippers when out of bed   room organization consistent   safety round/check completed  Taken 6/1/2022 2200 by Emelia Kramer RN  Safety Promotion/Fall Prevention:   assistive device/personal items within reach   clutter free environment maintained   fall prevention program maintained   nonskid shoes/slippers when out of bed   room organization consistent   safety round/check completed  Taken 6/1/2022 2155 by Emelia Kramer RN  Safety Promotion/Fall Prevention:   room organization consistent   safety round/check completed   nonskid shoes/slippers when out of bed   assistive device/personal items within reach   clutter free environment maintained   fall  prevention program maintained  Taken 6/1/2022 2010 by Emelia Kramer RN  Safety Promotion/Fall Prevention:   activity supervised   assistive device/personal items within reach   clutter free environment maintained   fall prevention program maintained   nonskid shoes/slippers when out of bed   room organization consistent   safety round/check completed  Intervention: Prevent Skin Injury  Recent Flowsheet Documentation  Taken 6/1/2022 2155 by Emelia Kramer RN  Body Position:   sitting up in bed   position changed independently  Intervention: Prevent and Manage VTE (Venous Thromboembolism) Risk  Recent Flowsheet Documentation  Taken 6/1/2022 2155 by Emelia Kramer RN  Activity Management: up ad donny  VTE Prevention/Management:   bilateral   dorsiflexion/plantar flexion performed  Intervention: Prevent Infection  Recent Flowsheet Documentation  Taken 6/2/2022 0206 by Emelia Kramer RN  Infection Prevention:   environmental surveillance performed   visitors restricted/screened   single patient room provided   rest/sleep promoted   personal protective equipment utilized   hand hygiene promoted  Taken 6/2/2022 0050 by Emelia Kramer RN  Infection Prevention:   environmental surveillance performed   visitors restricted/screened   single patient room provided   rest/sleep promoted   personal protective equipment utilized   hand hygiene promoted  Taken 6/1/2022 2200 by Emelia Kramer RN  Infection Prevention:   environmental surveillance performed   visitors restricted/screened   single patient room provided   rest/sleep promoted   personal protective equipment utilized   hand hygiene promoted  Taken 6/1/2022 2155 by Emelia Kramer RN  Infection Prevention:   environmental surveillance performed   visitors restricted/screened   single patient room provided   rest/sleep promoted   personal protective equipment utilized   hand hygiene promoted  Taken 6/1/2022 2010 by Emelia Kramer RN  Infection Prevention:    environmental surveillance performed   visitors restricted/screened   single patient room provided   rest/sleep promoted   personal protective equipment utilized   hand hygiene promoted  Goal: Optimal Comfort and Wellbeing  Outcome: Ongoing, Progressing  Intervention: Monitor Pain and Promote Comfort  Recent Flowsheet Documentation  Taken 6/1/2022 2155 by Emelia Kramer RN  Pain Management Interventions: see MAR  Taken 6/1/2022 2010 by Emelia Kramer RN  Pain Management Interventions: see MAR  Intervention: Provide Person-Centered Care  Recent Flowsheet Documentation  Taken 6/1/2022 2155 by Emelia Kramer RN  Trust Relationship/Rapport:   care explained   choices provided   emotional support provided   empathic listening provided   questions answered   questions encouraged   thoughts/feelings acknowledged   reassurance provided  Goal: Readiness for Transition of Care  Outcome: Ongoing, Progressing           Problem: Adult Inpatient Plan of Care  Goal: Absence of Hospital-Acquired Illness or Injury  Intervention: Identify and Manage Fall Risk  Recent Flowsheet Documentation  Taken 6/2/2022 0206 by Emelia Kramer RN  Safety Promotion/Fall Prevention:   assistive device/personal items within reach   clutter free environment maintained   fall prevention program maintained   nonskid shoes/slippers when out of bed   room organization consistent   safety round/check completed  Taken 6/2/2022 0050 by Emelia Kramer RN  Safety Promotion/Fall Prevention:   assistive device/personal items within reach   clutter free environment maintained   fall prevention program maintained   nonskid shoes/slippers when out of bed   room organization consistent   safety round/check completed  Taken 6/1/2022 2200 by Emelia Kramer RN  Safety Promotion/Fall Prevention:   assistive device/personal items within reach   clutter free environment maintained   fall prevention program maintained   nonskid shoes/slippers when out of bed   room  organization consistent   safety round/check completed  Taken 6/1/2022 2155 by Emelia Kramer RN  Safety Promotion/Fall Prevention:   room organization consistent   safety round/check completed   nonskid shoes/slippers when out of bed   assistive device/personal items within reach   clutter free environment maintained   fall prevention program maintained  Taken 6/1/2022 2010 by Emelia Kramer RN  Safety Promotion/Fall Prevention:   activity supervised   assistive device/personal items within reach   clutter free environment maintained   fall prevention program maintained   nonskid shoes/slippers when out of bed   room organization consistent   safety round/check completed  Intervention: Prevent Skin Injury  Recent Flowsheet Documentation  Taken 6/1/2022 2155 by Emelia Kramer RN  Body Position:   sitting up in bed   position changed independently  Intervention: Prevent and Manage VTE (Venous Thromboembolism) Risk  Recent Flowsheet Documentation  Taken 6/1/2022 2155 by Emelia Kramer RN  Activity Management: up ad donny  VTE Prevention/Management:   bilateral   dorsiflexion/plantar flexion performed  Intervention: Prevent Infection  Recent Flowsheet Documentation  Taken 6/2/2022 0206 by Emelia Kramer RN  Infection Prevention:   environmental surveillance performed   visitors restricted/screened   single patient room provided   rest/sleep promoted   personal protective equipment utilized   hand hygiene promoted  Taken 6/2/2022 0050 by Emelia Kramer RN  Infection Prevention:   environmental surveillance performed   visitors restricted/screened   single patient room provided   rest/sleep promoted   personal protective equipment utilized   hand hygiene promoted  Taken 6/1/2022 2200 by Emelia Kramer RN  Infection Prevention:   environmental surveillance performed   visitors restricted/screened   single patient room provided   rest/sleep promoted   personal protective equipment utilized   hand hygiene  promoted  Taken 6/1/2022 2155 by Emelia Kramer RN  Infection Prevention:   environmental surveillance performed   visitors restricted/screened   single patient room provided   rest/sleep promoted   personal protective equipment utilized   hand hygiene promoted  Taken 6/1/2022 2010 by Emelia Kramer RN  Infection Prevention:   environmental surveillance performed   visitors restricted/screened   single patient room provided   rest/sleep promoted   personal protective equipment utilized   hand hygiene promoted  Goal: Optimal Comfort and Wellbeing  Intervention: Monitor Pain and Promote Comfort  Recent Flowsheet Documentation  Taken 6/1/2022 2155 by Emelia Kramer RN  Pain Management Interventions: see MAR  Taken 6/1/2022 2010 by Emelia Kramer RN  Pain Management Interventions: see MAR  Intervention: Provide Person-Centered Care  Recent Flowsheet Documentation  Taken 6/1/2022 2155 by Emelia Kramer RN  Trust Relationship/Rapport:   care explained   choices provided   emotional support provided   empathic listening provided   questions answered   questions encouraged   thoughts/feelings acknowledged   reassurance provided

## 2022-06-02 NOTE — LACTATION NOTE
Lactation Consult Note  PT called for latch assessment. Upon entering the room she is trying to BF the baby, but he is falling asleep. Assisted mother in waking up the baby and in latching him in a laid back positron to the left breast. Educated mom starting nose to nipple to obtain deep latch and baby was able to achieve it after multiple attempts and changing few positions. PT’s nipples are long and graspable but baby used pacifier earlier and now  has nipple confusion. When finally placed on laid back hold he started BF. He is latching well, has nutritive suckle, and has a good jaw rotation, but is falling asleep easily. Discussed ways to keep baby awake during BF. Mom declines any questions and concerns at this time. Encouraged to call LC if needing further assistance.        Evaluation Completed: 2022 03:30 EDT  Patient Name: Anne Marie Patel  :  1995  MRN:  [id number]     REFERRAL  INFORMATION:                          Date of Referral: 22   Person Making Referral: patient  Maternal Reason for Referral: breastfeeding currently  Infant Reason for Referral: other (see comments) (latch assessment)    DELIVERY HISTORY:        Skin to skin initiation date/time: 2022  11:01 AM   Skin to skin end date/time: 2022  1:10 PM        MATERNAL ASSESSMENT:  Breast Size Issue: none (22)  Breast Shape: Bilateral:, round (22)  Breast Density: Bilateral:, filling (22)  Areola: Bilateral:, elastic (22)  Nipples: Bilateral:, everted, graspable (long) (22)                INFANT ASSESSMENT:  Information for the patient's :  Dipesh Patel [[id number]]   No past medical history on file.     Feeding Readiness Cues: rooting, eager (22)                     Feeding Interventions: latch assistance provided, sucking promoted (22)               Breastfeeding: breastfeeding, bilateral (22)   Infant Positioning:  cradle, cross-cradle, laid back (ventral) (22)         Effective Latch During Feeding: yes (22)   Suck/Swallow/Breathing Coordination: present (22)   Signs of Milk Transfer: audible swallow (22)       Latch: 2-->grasps breast, tongue down, lips flanged, rhythmic sucking (22)   Audible Swallowin-->spontaneous and intermittent (24 hrs old) (22)   Type of Nipple: 2-->everted (after stimulation) (22)   Comfort (Breast/Nipple): 1-->filling, red/small blisters/bruises, mild/mod discomfort (22)   Hold (Positioning): 1-->minimal assist, teach one side, mother does other, staff holds (22)   Latch Score: 8 (22)                    MATERNAL INFANT FEEDING:     Maternal Emotional State: receptive, relaxed, independent (22)  Infant Positioning: cradle, cross-cradle, laid back (ventral) (22)   Signs of Milk Transfer: audible swallow (22)  Pain with Feeding: no (22)           Milk Ejection Reflex: present (22)           Latch Assistance: minimal assistance (22)                               EQUIPMENT TYPE:                                 BREAST PUMPING:          LACTATION REFERRALS:

## 2022-06-02 NOTE — LACTATION NOTE
Mom reports baby is BF well this morning. Educated on baby's expected output and weight gain. Mom has [facility] info    Lactation Consult Note    Evaluation Completed: 2022 08:14 EDT  Patient Name: Anne Marie Patel  :  1995  MRN:  [id number]     REFERRAL  INFORMATION:                          Date of Referral: 22   Person Making Referral: patient  Maternal Reason for Referral: breastfeeding currently  Infant Reason for Referral: other (see comments) (latch assessment)    DELIVERY HISTORY:        Skin to skin initiation date/time: 2022  11:01 AM   Skin to skin end date/time: 2022  1:10 PM        MATERNAL ASSESSMENT:                               INFANT ASSESSMENT:  Information for the patient's :  Dipesh Patel [[id number]]   No past medical history on file.                                                                                                     MATERNAL INFANT FEEDING:                                                                       EQUIPMENT TYPE:                                 BREAST PUMPING:          LACTATION REFERRALS:

## 2022-06-02 NOTE — DISCHARGE SUMMARY
section Discharge Summary    Date of Admission: 2022  Date of Discharge:  2022      Patient: Anne Marie Patel      MR#:[id number]    Surgeon/OB: Phillip Palomo MD    Discharge Diagnosis:  section at 39w0d, uncomplicated recovery    Procedures:  , Low Transverse     2022    10:51 AM      Anesthesia:  Spinal     Presenting Problem/History of Present Illness  Previous  section [Z98.891]  H/O  section [Z98.891]     Patient Active Problem List   Diagnosis   • Previous  section   • History of prior pregnancy with IUGR    • Anxiety   • GERD (gastroesophageal reflux disease)   • Vaginal bleeding in pregnancy   • Placenta succenturiate lobe affecting fetus   • Breast cyst, left   • GBS (group B Streptococcus carrier), +RV culture, currently pregnant   • H/O  section       Hospital Course  Patient is a 27 y.o. female  at 39w0d status post  section with uneventful postoperative recovery.  Patient was advanced to regular diet on postoperative day#1.  On discharge, ambulating, tolerating a regular diet without any difficulties and her incision is dry, clean and intact.     Infant:   male  fetus 3060 g (6 lb 11.9 oz)  with Apgar scores of 9 , 9  at five minutes.    Condition on Discharge:  Stable    Vital Signs  Temp:  [97.4 °F (36.3 °C)-98.5 °F (36.9 °C)] 98.5 °F (36.9 °C)  Heart Rate:  [64-71] 65  Resp:  [16] 16  BP: ()/(60-73) 98/62    Lab Results   Component Value Date    WBC 10.92 (H) 2022    HGB 12.1 2022    HCT 36.1 2022    MCV 92.8 2022     2022       Discharge Disposition  Home or Self Care    Discharge Medications     Discharge Medications      New Medications      Instructions Start Date   ibuprofen 800 MG tablet  Commonly known as: ADVIL,MOTRIN   800 mg, Oral, Every 6 Hours PRN      oxyCODONE-acetaminophen 5-325 MG per tablet  Commonly known as: PERCOCET   1 tablet, Oral, Every 4 Hours  PRN         Continue These Medications      Instructions Start Date   cetirizine 10 MG tablet  Commonly known as: zyrTEC   10 mg, Oral, Daily      docusate sodium 100 MG capsule  Commonly known as: COLACE   100 mg, Oral, 2 Times Daily      omeprazole 40 MG capsule  Commonly known as: priLOSEC   40 mg, Oral, Daily         Stop These Medications    doxylamine 25 MG tablet  Commonly known as: UNISOM     PNV PO            Discharge Diet: Regular    Follow-up Appointments  Future Appointments   Date Time Provider Department Center   6/13/2022 11:15 AM Phillip Palomo MD [facility]   7/12/2022  9:30 AM Phillip Palomo MD [facility]     Additional Instructions for the Follow-ups that You Need to Schedule     Call MD With Problems / Concerns   As directed      If you have newly increased incisional pain, drainage or bleeding from the incision, skin redness around the incision  If you have heavy bleeding, soaking through a pad per hour or passing large clots  If you have persistent headache, visual changes, upper abdominal pain  If you have persistent nausea, vomiting  If your pain is severe and uncontrollable    Order Comments: If you have newly increased incisional pain, drainage or bleeding from the incision, skin redness around the incision If you have heavy bleeding, soaking through a pad per hour or passing large clots If you have persistent headache, visual changes, upper abdominal pain If you have persistent nausea, vomiting If your pain is severe and uncontrollable          Discharge Follow-up with Specified Provider: follow up with your primary OB provider in two weeks and in six weeks; 2 Weeks   As directed      To: follow up with your primary OB provider in two weeks and in six weeks    Follow Up: 2 Weeks    Follow Up Details: Call the office or follow up sooner if you are having any problems               Prenatal labs/vax: A+/I/-/- NR    Eliana Marshall MD  6/2/2022  10:06 EDT

## 2022-06-02 NOTE — LACTATION NOTE
This note was copied from a baby's chart.  Informed PT that LC is here to help with BF tonight. Offered assistance but mother declined, said she will call later, when baby is due to BF if she needs help. Reports infant is latching well.PT denies any questions and concerns at this time. Encouraged to call LC if needing further assistance

## 2022-06-13 ENCOUNTER — POSTPARTUM VISIT (OUTPATIENT)
Dept: OBSTETRICS AND GYNECOLOGY | Age: 27
End: 2022-06-13

## 2022-06-13 VITALS
HEIGHT: 63 IN | DIASTOLIC BLOOD PRESSURE: 78 MMHG | SYSTOLIC BLOOD PRESSURE: 122 MMHG | WEIGHT: 115 LBS | BODY MASS INDEX: 20.38 KG/M2

## 2022-06-13 DIAGNOSIS — Z98.890 POST-OPERATIVE STATE: Primary | ICD-10-CM

## 2022-06-13 PROBLEM — K21.9 GERD (GASTROESOPHAGEAL REFLUX DISEASE): Status: RESOLVED | Noted: 2022-03-31 | Resolved: 2022-06-13

## 2022-06-13 PROBLEM — Z98.891 PREVIOUS CESAREAN SECTION: Status: RESOLVED | Noted: 2021-10-15 | Resolved: 2022-06-13

## 2022-06-13 PROBLEM — Z87.59 HISTORY OF PRIOR PREGNANCY WITH IUGR NEWBORN: Status: RESOLVED | Noted: 2021-10-15 | Resolved: 2022-06-13

## 2022-06-13 PROBLEM — O99.820 GBS (GROUP B STREPTOCOCCUS CARRIER), +RV CULTURE, CURRENTLY PREGNANT: Status: RESOLVED | Noted: 2022-05-16 | Resolved: 2022-06-13

## 2022-06-13 PROBLEM — Z98.891 H/O CESAREAN SECTION: Status: RESOLVED | Noted: 2022-05-31 | Resolved: 2022-06-13

## 2022-06-13 PROBLEM — O46.90 VAGINAL BLEEDING IN PREGNANCY: Status: RESOLVED | Noted: 2022-04-14 | Resolved: 2022-06-13

## 2022-06-13 PROCEDURE — 0503F POSTPARTUM CARE VISIT: CPT | Performed by: OBSTETRICS & GYNECOLOGY

## 2022-06-13 NOTE — PROGRESS NOTES
"Subjective   Chief Complaint   Patient presents with   • Postpartum Care     2 week postpartum     Anne Marie Patel is a 27 y.o. year old  presenting to be seen for her post-operative visit.  She reports she is doing well.  She did call about starting Zoloft.  She does not want a referral to therapy.  She stopped the Percocet and is taking Motrin only.  She is just had some dark brown bleeding on and off.  There was no pathology result associated with Anne Marie's recent procedure.      OTHER THINGS SHE WANTS TO DISCUSS TODAY:  Nothing else    The following portions of the patient's history were reviewed and updated as appropriate:current medications and allergies       Objective   /78   Ht 160 cm (63\")   Wt 52.2 kg (115 lb)   LMP 2021 (Exact Date)   Breastfeeding No   BMI 20.37 kg/m²     General:  well developed; well nourished  no acute distress   Abdomen: soft, non-tender; no masses  Incision is clean dry and intact   Pelvis: Not performed.          Assessment   1. S/P      Plan   1. Return for postpartum visit in 4 weeks  2. Continue Zoloft for anxiety and postpartum depression        No orders of the defined types were placed in this encounter.             Note: Speech recognition transcription software may have been used to create portions of this document.  An attempt at proofreading has been made but errors in transcription could still be present.    "

## 2022-06-27 RX ORDER — OMEPRAZOLE 40 MG/1
CAPSULE, DELAYED RELEASE ORAL
Qty: 90 CAPSULE | OUTPATIENT
Start: 2022-06-27

## 2022-07-12 ENCOUNTER — POSTPARTUM VISIT (OUTPATIENT)
Dept: OBSTETRICS AND GYNECOLOGY | Age: 27
End: 2022-07-12

## 2022-07-12 VITALS
BODY MASS INDEX: 20.91 KG/M2 | DIASTOLIC BLOOD PRESSURE: 70 MMHG | SYSTOLIC BLOOD PRESSURE: 106 MMHG | HEIGHT: 63 IN | WEIGHT: 118 LBS

## 2022-07-12 DIAGNOSIS — N60.02 BREAST CYST, LEFT: Primary | ICD-10-CM

## 2022-07-12 PROCEDURE — 0503F POSTPARTUM CARE VISIT: CPT | Performed by: OBSTETRICS & GYNECOLOGY

## 2022-07-12 NOTE — PROGRESS NOTES
"Subjective   Anne Marie Patel is a 27 y.o. female who presents for a postpartum visit. She is 6 weeks postpartum following a c/s. I have fully reviewed the prenatal and intrapartum course. Postpartum course has been uneventful. Baby is feeding by breast. Bleeding has been normal in amount and decreasing. Bowel function is normal. Bladder function is normal. Patient not sexually active at this time. Contraception method is discussed. Postpartum depression screening: better with zoloft     The following portions of the patient's history were reviewed and updated as appropriate: allergies, current medications,and problem list.    Review of Systems  Pertinent items are noted in HPI.    Objective   /70   Ht 160 cm (63\")   Wt 53.5 kg (118 lb)   Breastfeeding Yes   BMI 20.90 kg/m²    General:  Alert and oriented, NAD    Breasts:         Heart:     Abdomen: Normal findings, nontender    Vulva: Normal, well-healed    Vagina: No lesions or abnormal discharge   Cervix:  Normal with no cervical motion tenderness   Corpus: Normal for post partum visit   Adnexa:  Non tender, non enlarged         Assessment & Plan     Normal postpartum exam. Pap smear not done at today's visit.    1. Contraception: condoms  2. Slow return to normal activities reviewed. Continue prenatal vitamins.  3. Follow up in 12 months or sooner as needed.           "

## 2022-11-10 ENCOUNTER — TELEPHONE (OUTPATIENT)
Dept: OBSTETRICS AND GYNECOLOGY | Age: 27
End: 2022-11-10

## 2022-11-10 NOTE — TELEPHONE ENCOUNTER
PT HAD AN APPT TODAY, 11.10.22, AT 10:30 AM WITH DR. KAUR FOR A GYN F/U BUT HAD TO RESCHEDULE.  NEW APPT:  12.1.22 AT 2:00 PM WITH CLOVIS MORALES.

## 2022-12-01 ENCOUNTER — OFFICE VISIT (OUTPATIENT)
Dept: OBSTETRICS AND GYNECOLOGY | Age: 27
End: 2022-12-01

## 2022-12-01 VITALS
WEIGHT: 123 LBS | BODY MASS INDEX: 21.79 KG/M2 | HEIGHT: 63 IN | SYSTOLIC BLOOD PRESSURE: 112 MMHG | DIASTOLIC BLOOD PRESSURE: 68 MMHG

## 2022-12-01 DIAGNOSIS — Z30.09 CONTRACEPTIVE EDUCATION: Primary | ICD-10-CM

## 2022-12-01 PROCEDURE — 99213 OFFICE O/P EST LOW 20 MIN: CPT | Performed by: PHYSICIAN ASSISTANT

## 2022-12-01 NOTE — PROGRESS NOTES
"Subjective     Chief Complaint   Patient presents with   • Consult     Birth control consult, pt is currently breastfeeding.       Anne Marie Patel is a 27 y.o.  whose LMP is No LMP recorded (lmp unknown). presents with questions about BC    She is not on BC  Uses condoms   Has never used BC  Thinking about an iud     Disc all iud options-paragard, mirena, kyleena and skyl and pros and cons of both  She does think she would like kyleena    She is consistent with condoms  Currently BF  No menses since delivery  Can place at any time    No Additional Complaints Reported    The following portions of the patient's history were reviewed and updated as appropriate:vital signs, allergies, current medications, past family history, past medical history, past social history, past surgical history and problem list      Review of Systems   Genitourinary:positive for contraceptive education     Objective      /68   Ht 160 cm (63\")   Wt 55.8 kg (123 lb)   LMP  (LMP Unknown)   Breastfeeding Yes   BMI 21.79 kg/m²     Physical Exam    General:   alert, comfortable and no distress   Heart: Not performed today   Lungs: Not performed today.   Breast: Not performed today   Neck: Not performed today   Abdomen: Not performed today   CVA: Not performed today   Pelvis: Not performed today   Extremities: Not performed today   Neurologic: negative   Psychiatric: Normal affect, judgement, and mood       Lab Review   Labs: No data reviewed    Imaging   No data reviewed    Assessment & Plan     ASSESSMENT  1. Contraceptive education          PLAN  1.  Sign pw for iud and plan placement soon. HO on pre insertion instructions given and PW on kyleena given.     Follow up: 2 week(s)    MIO Jaramillo  2022           "

## 2023-01-12 ENCOUNTER — OFFICE VISIT (OUTPATIENT)
Dept: OBSTETRICS AND GYNECOLOGY | Age: 28
End: 2023-01-12
Payer: COMMERCIAL

## 2023-01-12 VITALS
WEIGHT: 122 LBS | SYSTOLIC BLOOD PRESSURE: 112 MMHG | BODY MASS INDEX: 21.62 KG/M2 | DIASTOLIC BLOOD PRESSURE: 62 MMHG | HEIGHT: 63 IN

## 2023-01-12 DIAGNOSIS — Z30.430 ENCOUNTER FOR IUD INSERTION: ICD-10-CM

## 2023-01-12 DIAGNOSIS — Z01.812 PRE-PROCEDURE LAB EXAM: Primary | ICD-10-CM

## 2023-01-12 LAB
B-HCG UR QL: NEGATIVE
EXPIRATION DATE: NORMAL
INTERNAL NEGATIVE CONTROL: NORMAL
INTERNAL POSITIVE CONTROL: NORMAL
Lab: NORMAL

## 2023-01-12 PROCEDURE — 76830 TRANSVAGINAL US NON-OB: CPT | Performed by: OBSTETRICS & GYNECOLOGY

## 2023-01-12 PROCEDURE — 81025 URINE PREGNANCY TEST: CPT | Performed by: PHYSICIAN ASSISTANT

## 2023-01-12 PROCEDURE — 58300 INSERT INTRAUTERINE DEVICE: CPT | Performed by: PHYSICIAN ASSISTANT

## 2023-01-26 ENCOUNTER — HOSPITAL ENCOUNTER (OUTPATIENT)
Dept: ULTRASOUND IMAGING | Facility: HOSPITAL | Age: 28
Discharge: HOME | End: 2023-01-26
Admitting: OBSTETRICS & GYNECOLOGY
Payer: COMMERCIAL

## 2023-01-26 DIAGNOSIS — N60.02 BENIGN BREAST CYST IN FEMALE, LEFT: ICD-10-CM

## 2023-01-26 PROCEDURE — 76642 ULTRASOUND BREAST LIMITED: CPT

## 2023-03-02 ENCOUNTER — OFFICE VISIT (OUTPATIENT)
Dept: OBSTETRICS AND GYNECOLOGY | Age: 28
End: 2023-03-02
Payer: COMMERCIAL

## 2023-03-02 VITALS
HEIGHT: 63 IN | WEIGHT: 121 LBS | SYSTOLIC BLOOD PRESSURE: 102 MMHG | DIASTOLIC BLOOD PRESSURE: 62 MMHG | BODY MASS INDEX: 21.44 KG/M2

## 2023-03-02 DIAGNOSIS — Z30.431 IUD CHECK UP: Primary | ICD-10-CM

## 2023-03-02 PROCEDURE — 99213 OFFICE O/P EST LOW 20 MIN: CPT | Performed by: PHYSICIAN ASSISTANT

## 2023-03-02 NOTE — PROGRESS NOTES
"Subjective     Chief Complaint   Patient presents with   • Follow-up     6 week iud check up, Brandi was inserted 2023.  Has been bleeding on and off since insertion and occ cramping       Anne aMrie Patel is a 27 y.o.  whose LMP is No LMP recorded. presents for iud check    She is doing well  Has some cramping and spotting  Nothing major but annoying    Has some allergic type thing going on  Affects her throat  Is taking claritin daily  Also has HA, ?sleep, hormone or allergic in nature  Will monitor    No Additional Complaints Reported    The following portions of the patient's history were reviewed and updated as appropriate:vital signs, allergies, current medications, past family history, past medical history, past social history, past surgical history and problem list      Review of Systems   Genitourinary:positive for iud check     Objective      /62   Ht 160 cm (63\")   Wt 54.9 kg (121 lb)   Breastfeeding Yes   BMI 21.43 kg/m²     Physical Exam    General:   alert, comfortable and no distress   Heart: Not performed today   Lungs: Not performed today.   Breast: Not performed today   Neck: Not performed today   Abdomen: Not performed today   CVA: Not performed today   Pelvis: External genitalia: normal general appearance  Vaginal: normal mucosa without prolapse or lesions and presence of blood  Cervix: normal appearance and IUD string visualized   Extremities: Not performed today   Neurologic: negative   Psychiatric: Normal affect, judgement, and mood       Lab Review   Labs: No data reviewed    Imaging   No data reviewed    Assessment & Plan     ASSESSMENT  1. IUD check up          PLAN  1. IUD strings seen.     2. Notes mild cramping and spotting along with a HA. Could be allergic in nature? Will monitor and could consider removal if HA persists and no other causal factor found    Follow up: MIO Eaton  3/2/2023           "

## 2024-02-29 ENCOUNTER — OFFICE VISIT (OUTPATIENT)
Dept: OBSTETRICS AND GYNECOLOGY | Age: 29
End: 2024-02-29
Payer: COMMERCIAL

## 2024-02-29 VITALS
WEIGHT: 115 LBS | DIASTOLIC BLOOD PRESSURE: 72 MMHG | HEIGHT: 63 IN | BODY MASS INDEX: 20.38 KG/M2 | SYSTOLIC BLOOD PRESSURE: 120 MMHG

## 2024-02-29 DIAGNOSIS — Z12.4 SCREENING FOR MALIGNANT NEOPLASM OF CERVIX: ICD-10-CM

## 2024-02-29 DIAGNOSIS — Z01.419 WELL FEMALE EXAM WITH ROUTINE GYNECOLOGICAL EXAM: Primary | ICD-10-CM

## 2024-02-29 DIAGNOSIS — Z11.51 SCREENING FOR HUMAN PAPILLOMAVIRUS (HPV): ICD-10-CM

## 2024-02-29 DIAGNOSIS — F41.9 ANXIETY: ICD-10-CM

## 2024-02-29 DIAGNOSIS — T83.32XA INTRAUTERINE CONTRACEPTIVE DEVICE THREADS LOST, INITIAL ENCOUNTER: ICD-10-CM

## 2024-02-29 PROBLEM — N60.02 BREAST CYST, LEFT: Status: RESOLVED | Noted: 2022-04-28 | Resolved: 2024-02-29

## 2024-02-29 NOTE — PROGRESS NOTES
Subjective     Chief Complaint   Patient presents with    Gynecologic Exam     AC       History of Present Illness    Anne Marie Patel is a 28 y.o.  who presents for annual exam.  The patient's kids are ages 4 and 2.  She is thinking about having another baby in about a year.  She was on Zoloft postpartum and then stopped it but feels like she needs it again for her anxiety.  She does have some symptoms with ovulation monthly.  She notes some pressure and nausea on and off for about 4 days.  She does take NSAIDs and that does help  Her menses are regular every 28-30 days, lasting  7 days light  , dysmenorrhea mild, occurring first 1-2 days of flow   Obstetric History:  OB History          2    Para   2    Term   2            AB        Living   2         SAB        IAB        Ectopic        Molar        Multiple   0    Live Births   2          Obstetric Comments   Placental path showed small placenta with decidual necrosis.              Menstrual History:     Patient's last menstrual period was 2024.         Current contraception: IUD  Kyleena 1 year  2023   History of abnormal Pap smear: no  Received Gardasil immunization: no  Perform regular self breast exam : yes  Family history of uterine or ovarian cancer: no  Family History of colon cancer: no  Family history of breast cancer: no    Mammogram: not indicated.  Colonoscopy: not indicated.  DEXA: not indicated.    Exercise: exercises 7 times a week  Calcium/Vitamin D: adequate intake and uses supplements    The following portions of the patient's history were reviewed and updated as appropriate: allergies, current medications, past family history, past medical history, past social history, past surgical history, and problem list.    Review of Systems    Review of Systems   Constitutional: Negative for fatigue.   Respiratory: Negative for shortness of breath.    Gastrointestinal: Negative for abdominal pain.   Genitourinary: Negative for  "dysuria.   Neurological: Negative for headaches.   Psychiatric/Behavioral: Negative for dysphoric mood.     Objective   Physical Exam    /72   Ht 160 cm (63\")   Wt 52.2 kg (115 lb)   LMP 02/06/2024   Breastfeeding No   BMI 20.37 kg/m²     General:   alert, appears stated age and cooperative   Neck: thyroid normal to palpation   Heart: regular rate and rhythm   Lungs: clear to auscultation bilaterally   Abdomen: soft, non-tender, without masses or organomegaly   Breast: inspection negative, no nipple discharge or bleeding, no masses or nodularity palpable   Vulva: normal, Bartholin's, Urethra, [place]'s normal   Vagina: normal mucosa, normal discharge   Cervix: no cervical motion tenderness and no lesions   Uterus: non-tender, retroverted   Adnexa: no mass, fullness, tenderness   Rectal: not indicated     Assessment & Plan   Diagnoses and all orders for this visit:    1. Well female exam with routine gynecological exam (Primary)  -     IGP, Apt HPV,rfx 16 / 18,45    2. Screening for human papillomavirus (HPV)  -     IGP, Apt HPV,rfx 16 / 18,45    3. Screening for malignant neoplasm of cervix  -     IGP, Apt HPV,rfx 16 / 18,45    4. Anxiety    5. Intrauterine contraceptive device threads lost, initial encounter    Other orders  -     sertraline (ZOLOFT) 50 MG tablet; Take 1 tablet by mouth Daily.  Dispense: 90 tablet; Refill: 3      Zoloft will be restarted for anxiety  IUD string is not seen today so we will check ultrasound.    Ultrasound shows IUD in proper position.  Uterus is anteverted and normal in appearance.  Ovaries appear normal.  No free fluid in the pelvis.    All questions answered.  Breast self exam technique reviewed and patient encouraged to perform self-exam monthly.  Discussed healthy lifestyle modifications.  Recommended 30 minutes of aerobic exercise five times per week.  Discussed calcium needs to prevent osteoporosis.                     "

## 2024-03-04 LAB
CYTOLOGIST CVX/VAG CYTO: NORMAL
CYTOLOGY CVX/VAG DOC CYTO: NORMAL
CYTOLOGY CVX/VAG DOC THIN PREP: NORMAL
DX ICD CODE: NORMAL
HPV I/H RISK 4 DNA CVX QL PROBE+SIG AMP: NEGATIVE
Lab: NORMAL
OTHER STN SPEC: NORMAL
STAT OF ADQ CVX/VAG CYTO-IMP: NORMAL

## 2024-11-14 ENCOUNTER — OFFICE VISIT (OUTPATIENT)
Dept: OBSTETRICS AND GYNECOLOGY | Age: 29
End: 2024-11-14
Payer: COMMERCIAL

## 2024-11-14 VITALS
DIASTOLIC BLOOD PRESSURE: 62 MMHG | SYSTOLIC BLOOD PRESSURE: 110 MMHG | BODY MASS INDEX: 20.2 KG/M2 | HEIGHT: 63 IN | WEIGHT: 114 LBS

## 2024-11-14 DIAGNOSIS — Z30.432 ENCOUNTER FOR IUD REMOVAL: Primary | ICD-10-CM

## 2024-11-14 NOTE — PROGRESS NOTES
IUD Removal    Date of procedure:  11/14/2024    Risks and benefits discussed? yes  All questions answered? yes  Consents given by The patient  Reason for removal: Desires pregnancy        Procedure documentation:    A speculum was placed in order to view the cervix.  .  The IUD string was easily seen.  The string was grasped and the IUD was removed without difficulty.  The IUD did not appear to be adherent to the uterine cavity. It was removed intact.    She tolerated the procedure without any difficulty.     Post procedure instructions: Patient notified to call with heavy bleeding, fever or increasing pain.    Follow up needed: prn     Pt planning to try for pregnancy  Start PNV  Call for any issues

## (undated) DEVICE — ANTIBACTERIAL UNDYED BRAIDED (POLYGLACTIN 910), SYNTHETIC ABSORBABLE SUTURE: Brand: COATED VICRYL

## (undated) DEVICE — 3M(TM) TEGADERM(TM) TRANSPARENT FILM DRESSING FRAME STYLE 1627: Brand: 3M™ TEGADERM™

## (undated) DEVICE — KENDALL SCD EXPRESS SLEEVES, KNEE LENGTH, MEDIUM: Brand: KENDALL SCD

## (undated) DEVICE — GLV SURG BIOGEL LTX PF 7 1/2

## (undated) DEVICE — SUT MNCRYL PLS ANTIB UD 4/0 PS2 18IN

## (undated) DEVICE — NDL HYPO ECLPS SFTY 18G 1 1/2IN

## (undated) DEVICE — SUT MNCRYL PLS ANTIB UD 4/0 SH 27IN

## (undated) DEVICE — KT ART BLD GAS QUICK DRAW

## (undated) DEVICE — SUT VIC PLS 0 CTX 36IN UD VCP978H

## (undated) DEVICE — SOL IRR H2O BTL 1000ML STRL

## (undated) DEVICE — ADHS SKIN DERMABOND TOPICAL HI VISC

## (undated) DEVICE — GLV SURG TRIUMPH CLASSIC PF LTX 6 STRL

## (undated) DEVICE — SUT MNCRYL 0/0 CTX 36IN Y398H